# Patient Record
Sex: FEMALE | Race: WHITE | NOT HISPANIC OR LATINO
[De-identification: names, ages, dates, MRNs, and addresses within clinical notes are randomized per-mention and may not be internally consistent; named-entity substitution may affect disease eponyms.]

---

## 2017-01-04 ENCOUNTER — APPOINTMENT (OUTPATIENT)
Dept: ENDOCRINOLOGY | Facility: CLINIC | Age: 82
End: 2017-01-04

## 2017-01-04 VITALS — WEIGHT: 105 LBS | HEIGHT: 59.2 IN | BODY MASS INDEX: 21.17 KG/M2

## 2017-01-04 VITALS — OXYGEN SATURATION: 82 % | DIASTOLIC BLOOD PRESSURE: 70 MMHG | SYSTOLIC BLOOD PRESSURE: 110 MMHG | HEART RATE: 78 BPM

## 2017-01-04 RX ORDER — ASCORBIC ACID 500 MG
TABLET ORAL
Refills: 0 | Status: ACTIVE | COMMUNITY

## 2017-01-04 RX ORDER — DENOSUMAB 60 MG/ML
60 INJECTION SUBCUTANEOUS
Qty: 1 | Refills: 0 | Status: COMPLETED | OUTPATIENT
Start: 2017-01-04

## 2017-01-04 RX ADMIN — DENOSUMAB 0 MG/ML: 60 INJECTION SUBCUTANEOUS at 00:00

## 2017-06-07 ENCOUNTER — EMERGENCY (EMERGENCY)
Facility: HOSPITAL | Age: 82
LOS: 1 days | Discharge: HOME | End: 2017-06-07
Attending: EMERGENCY MEDICINE | Admitting: EMERGENCY MEDICINE
Payer: MEDICARE

## 2017-06-07 VITALS
SYSTOLIC BLOOD PRESSURE: 153 MMHG | RESPIRATION RATE: 16 BRPM | OXYGEN SATURATION: 96 % | DIASTOLIC BLOOD PRESSURE: 75 MMHG | HEART RATE: 66 BPM | TEMPERATURE: 98 F

## 2017-06-07 VITALS
TEMPERATURE: 98 F | HEART RATE: 62 BPM | DIASTOLIC BLOOD PRESSURE: 83 MMHG | OXYGEN SATURATION: 95 % | RESPIRATION RATE: 16 BRPM | SYSTOLIC BLOOD PRESSURE: 175 MMHG

## 2017-06-07 LAB
ALBUMIN SERPL ELPH-MCNC: 4.1 G/DL — SIGNIFICANT CHANGE UP (ref 3.3–5)
ALP SERPL-CCNC: 80 U/L — SIGNIFICANT CHANGE UP (ref 40–120)
ALT FLD-CCNC: 24 U/L RC — SIGNIFICANT CHANGE UP (ref 10–45)
ANION GAP SERPL CALC-SCNC: 11 MMOL/L — SIGNIFICANT CHANGE UP (ref 5–17)
APTT BLD: 25.9 SEC — LOW (ref 27.5–37.4)
AST SERPL-CCNC: 29 U/L — SIGNIFICANT CHANGE UP (ref 10–40)
BASOPHILS # BLD AUTO: 0.1 K/UL — SIGNIFICANT CHANGE UP (ref 0–0.2)
BASOPHILS NFR BLD AUTO: 1.3 % — SIGNIFICANT CHANGE UP (ref 0–2)
BILIRUB SERPL-MCNC: 0.3 MG/DL — SIGNIFICANT CHANGE UP (ref 0.2–1.2)
BUN SERPL-MCNC: 34 MG/DL — HIGH (ref 7–23)
CALCIUM SERPL-MCNC: 9.7 MG/DL — SIGNIFICANT CHANGE UP (ref 8.4–10.5)
CHLORIDE SERPL-SCNC: 99 MMOL/L — SIGNIFICANT CHANGE UP (ref 96–108)
CO2 SERPL-SCNC: 26 MMOL/L — SIGNIFICANT CHANGE UP (ref 22–31)
CREAT SERPL-MCNC: 1.16 MG/DL — SIGNIFICANT CHANGE UP (ref 0.5–1.3)
EOSINOPHIL # BLD AUTO: 0.4 K/UL — SIGNIFICANT CHANGE UP (ref 0–0.5)
EOSINOPHIL NFR BLD AUTO: 5.5 % — SIGNIFICANT CHANGE UP (ref 0–6)
GLUCOSE SERPL-MCNC: 110 MG/DL — HIGH (ref 70–99)
HCT VFR BLD CALC: 40.6 % — SIGNIFICANT CHANGE UP (ref 34.5–45)
HGB BLD-MCNC: 13.3 G/DL — SIGNIFICANT CHANGE UP (ref 11.5–15.5)
INR BLD: 1.03 RATIO — SIGNIFICANT CHANGE UP (ref 0.88–1.16)
LYMPHOCYTES # BLD AUTO: 1.2 K/UL — SIGNIFICANT CHANGE UP (ref 1–3.3)
LYMPHOCYTES # BLD AUTO: 15 % — SIGNIFICANT CHANGE UP (ref 13–44)
MCHC RBC-ENTMCNC: 31.2 PG — SIGNIFICANT CHANGE UP (ref 27–34)
MCHC RBC-ENTMCNC: 32.8 GM/DL — SIGNIFICANT CHANGE UP (ref 32–36)
MCV RBC AUTO: 95.1 FL — SIGNIFICANT CHANGE UP (ref 80–100)
MONOCYTES # BLD AUTO: 0.6 K/UL — SIGNIFICANT CHANGE UP (ref 0–0.9)
MONOCYTES NFR BLD AUTO: 8.2 % — SIGNIFICANT CHANGE UP (ref 2–14)
NEUTROPHILS # BLD AUTO: 5.4 K/UL — SIGNIFICANT CHANGE UP (ref 1.8–7.4)
NEUTROPHILS NFR BLD AUTO: 69.9 % — SIGNIFICANT CHANGE UP (ref 43–77)
PLATELET # BLD AUTO: 392 K/UL — SIGNIFICANT CHANGE UP (ref 150–400)
POTASSIUM SERPL-MCNC: 4.5 MMOL/L — SIGNIFICANT CHANGE UP (ref 3.5–5.3)
POTASSIUM SERPL-SCNC: 4.5 MMOL/L — SIGNIFICANT CHANGE UP (ref 3.5–5.3)
PROT SERPL-MCNC: 7.3 G/DL — SIGNIFICANT CHANGE UP (ref 6–8.3)
PROTHROM AB SERPL-ACNC: 11.1 SEC — SIGNIFICANT CHANGE UP (ref 9.8–12.7)
RBC # BLD: 4.27 M/UL — SIGNIFICANT CHANGE UP (ref 3.8–5.2)
RBC # FLD: 12.8 % — SIGNIFICANT CHANGE UP (ref 10.3–14.5)
SODIUM SERPL-SCNC: 136 MMOL/L — SIGNIFICANT CHANGE UP (ref 135–145)
WBC # BLD: 7.7 K/UL — SIGNIFICANT CHANGE UP (ref 3.8–10.5)
WBC # FLD AUTO: 7.7 K/UL — SIGNIFICANT CHANGE UP (ref 3.8–10.5)

## 2017-06-07 PROCEDURE — 85610 PROTHROMBIN TIME: CPT

## 2017-06-07 PROCEDURE — 85730 THROMBOPLASTIN TIME PARTIAL: CPT

## 2017-06-07 PROCEDURE — 71250 CT THORAX DX C-: CPT

## 2017-06-07 PROCEDURE — 99284 EMERGENCY DEPT VISIT MOD MDM: CPT | Mod: 25

## 2017-06-07 PROCEDURE — 73060 X-RAY EXAM OF HUMERUS: CPT

## 2017-06-07 PROCEDURE — 73030 X-RAY EXAM OF SHOULDER: CPT

## 2017-06-07 PROCEDURE — 71250 CT THORAX DX C-: CPT | Mod: 26

## 2017-06-07 PROCEDURE — 73020 X-RAY EXAM OF SHOULDER: CPT | Mod: 26,59,LT

## 2017-06-07 PROCEDURE — 70450 CT HEAD/BRAIN W/O DYE: CPT

## 2017-06-07 PROCEDURE — 80053 COMPREHEN METABOLIC PANEL: CPT

## 2017-06-07 PROCEDURE — 99284 EMERGENCY DEPT VISIT MOD MDM: CPT

## 2017-06-07 PROCEDURE — 85027 COMPLETE CBC AUTOMATED: CPT

## 2017-06-07 PROCEDURE — 96374 THER/PROPH/DIAG INJ IV PUSH: CPT

## 2017-06-07 PROCEDURE — 73060 X-RAY EXAM OF HUMERUS: CPT | Mod: 26,LT

## 2017-06-07 PROCEDURE — 73020 X-RAY EXAM OF SHOULDER: CPT

## 2017-06-07 PROCEDURE — 73030 X-RAY EXAM OF SHOULDER: CPT | Mod: 26,LT

## 2017-06-07 PROCEDURE — 70450 CT HEAD/BRAIN W/O DYE: CPT | Mod: 26

## 2017-06-07 RX ORDER — ACETAMINOPHEN 500 MG
1000 TABLET ORAL ONCE
Qty: 0 | Refills: 0 | Status: DISCONTINUED | OUTPATIENT
Start: 2017-06-07 | End: 2017-06-07

## 2017-06-07 RX ORDER — MORPHINE SULFATE 50 MG/1
2 CAPSULE, EXTENDED RELEASE ORAL ONCE
Qty: 0 | Refills: 0 | Status: DISCONTINUED | OUTPATIENT
Start: 2017-06-07 | End: 2017-06-07

## 2017-06-07 RX ADMIN — MORPHINE SULFATE 2 MILLIGRAM(S): 50 CAPSULE, EXTENDED RELEASE ORAL at 19:46

## 2017-06-07 NOTE — ED PROVIDER NOTE - CARE PLAN
Principal Discharge DX:	Rotator cuff injury  Instructions for follow-up, activity and diet:	1. Take Tylenol as needed for pain. Ice shoulder in 20 minute intervals several times per day. Wear sling as tolerated with pillow for support.  2. Follow up with orthopaedics in the next few days. See attached list.   3. Return to ED for change of symptoms including increased pain, numbness, tingling weakness, headaches, changes in vision, fevers and any other symptoms of concern.

## 2017-06-07 NOTE — ED ADULT NURSE NOTE - OBJECTIVE STATEMENT
pt is a 89 yr F s/p fall. pt was placing anti-slip caps on the bottom of chairs when the chair slipped and she fell on to her L side, pt recently fx her L ribs 7-8, c/o L shoulder/arm pain. no deformity, + limited ROM with pain. skin intact. denies cp/sob. denies hitting head or LOC.

## 2017-06-07 NOTE — ED ADULT NURSE NOTE - PMH
AI (Aortic Incompetence)    Cataract    GERD (Gastroesophageal Reflux Disease)    History of Osteoporosis    HTN (Hypertension)    Hypertriglyceridemia    Rib Fracture left side  - from fall one months ago    Thoracic Back Pain    Upper Back Pain

## 2017-06-07 NOTE — ED PROVIDER NOTE - PLAN OF CARE
1. Take Tylenol as needed for pain. Ice shoulder in 20 minute intervals several times per day. Wear sling as tolerated with pillow for support.  2. Follow up with orthopaedics in the next few days. See attached list.   3. Return to ED for change of symptoms including increased pain, numbness, tingling weakness, headaches, changes in vision, fevers and any other symptoms of concern.

## 2017-06-07 NOTE — ED ADULT NURSE NOTE - PSH
Cataract, repair  - right  - 7/10    History of Aortic Valve Replacement 2001    History of Colonoscopy    History of Tonsillectomy

## 2017-06-07 NOTE — ED PROVIDER NOTE - OBJECTIVE STATEMENT
89 year old female presents from EMS c/o fall and shoulder pain. Patient was trying to fix a chair at home when she lost her footing falling backwards landing on her left shoulder. She is c/o 9/10 left arm and shoulder pain.  She denies hitting her head, LOC. Patient is on coumadin.  She reports falling 2 weeks ago after standing on a box at home resulting in left sided rib fractures.

## 2017-06-07 NOTE — ED PROVIDER NOTE - MEDICAL DECISION MAKING DETAILS
Fall, R sided shoulder pain, unlikely dislocation; will eval for fx.  NV intact.  Pain control, XRs, reassess.  --BMM

## 2017-06-07 NOTE — ED PROVIDER NOTE - PHYSICAL EXAMINATION
CN intact. Normal sensation and strength of upper and lower extremities b/l. FROM of neck, upper and lower extremities b/l. No focal neuro deficits.   There is tenderness to palpation of the left shoulder with limited above head ROM.  On palpation of the ribs there is left sided tenderness and crepitus.   Patient is neurovascularly intact.

## 2017-07-14 ENCOUNTER — APPOINTMENT (OUTPATIENT)
Dept: ENDOCRINOLOGY | Facility: CLINIC | Age: 82
End: 2017-07-14

## 2017-07-14 VITALS
HEART RATE: 71 BPM | WEIGHT: 101 LBS | HEIGHT: 59.2 IN | BODY MASS INDEX: 20.36 KG/M2 | SYSTOLIC BLOOD PRESSURE: 126 MMHG | DIASTOLIC BLOOD PRESSURE: 70 MMHG | OXYGEN SATURATION: 98 %

## 2017-07-14 RX ORDER — DENOSUMAB 60 MG/ML
60 INJECTION SUBCUTANEOUS
Qty: 1 | Refills: 0 | Status: COMPLETED | OUTPATIENT
Start: 2017-07-14

## 2017-07-14 RX ADMIN — DENOSUMAB 0 MG/ML: 60 INJECTION SUBCUTANEOUS at 00:00

## 2017-07-30 ENCOUNTER — EMERGENCY (EMERGENCY)
Facility: HOSPITAL | Age: 82
LOS: 1 days | Discharge: HOME | End: 2017-07-30
Attending: EMERGENCY MEDICINE | Admitting: EMERGENCY MEDICINE
Payer: MEDICARE

## 2017-07-30 VITALS
DIASTOLIC BLOOD PRESSURE: 69 MMHG | RESPIRATION RATE: 16 BRPM | HEART RATE: 66 BPM | SYSTOLIC BLOOD PRESSURE: 145 MMHG | TEMPERATURE: 98 F | OXYGEN SATURATION: 96 %

## 2017-07-30 VITALS
HEART RATE: 65 BPM | SYSTOLIC BLOOD PRESSURE: 134 MMHG | TEMPERATURE: 98 F | DIASTOLIC BLOOD PRESSURE: 77 MMHG | RESPIRATION RATE: 18 BRPM

## 2017-07-30 LAB
ALBUMIN SERPL ELPH-MCNC: 3.9 G/DL — SIGNIFICANT CHANGE UP (ref 3.3–5)
ALP SERPL-CCNC: 82 U/L — SIGNIFICANT CHANGE UP (ref 40–120)
ALT FLD-CCNC: 22 U/L RC — SIGNIFICANT CHANGE UP (ref 10–45)
ANION GAP SERPL CALC-SCNC: 12 MMOL/L — SIGNIFICANT CHANGE UP (ref 5–17)
APPEARANCE UR: CLEAR — SIGNIFICANT CHANGE UP
AST SERPL-CCNC: 26 U/L — SIGNIFICANT CHANGE UP (ref 10–40)
BASOPHILS # BLD AUTO: 0.1 K/UL — SIGNIFICANT CHANGE UP (ref 0–0.2)
BASOPHILS NFR BLD AUTO: 0.6 % — SIGNIFICANT CHANGE UP (ref 0–2)
BILIRUB SERPL-MCNC: 0.3 MG/DL — SIGNIFICANT CHANGE UP (ref 0.2–1.2)
BILIRUB UR-MCNC: NEGATIVE — SIGNIFICANT CHANGE UP
BUN SERPL-MCNC: 28 MG/DL — HIGH (ref 7–23)
CALCIUM SERPL-MCNC: 9.1 MG/DL — SIGNIFICANT CHANGE UP (ref 8.4–10.5)
CHLORIDE SERPL-SCNC: 100 MMOL/L — SIGNIFICANT CHANGE UP (ref 96–108)
CO2 SERPL-SCNC: 27 MMOL/L — SIGNIFICANT CHANGE UP (ref 22–31)
COLOR SPEC: YELLOW — SIGNIFICANT CHANGE UP
CREAT SERPL-MCNC: 1.03 MG/DL — SIGNIFICANT CHANGE UP (ref 0.5–1.3)
DIFF PNL FLD: NEGATIVE — SIGNIFICANT CHANGE UP
EOSINOPHIL # BLD AUTO: 0.5 K/UL — SIGNIFICANT CHANGE UP (ref 0–0.5)
EOSINOPHIL NFR BLD AUTO: 5.1 % — SIGNIFICANT CHANGE UP (ref 0–6)
EPI CELLS # UR: SIGNIFICANT CHANGE UP /HPF
GLUCOSE SERPL-MCNC: 92 MG/DL — SIGNIFICANT CHANGE UP (ref 70–99)
GLUCOSE UR QL: NEGATIVE — SIGNIFICANT CHANGE UP
HCT VFR BLD CALC: 40.4 % — SIGNIFICANT CHANGE UP (ref 34.5–45)
HGB BLD-MCNC: 13.4 G/DL — SIGNIFICANT CHANGE UP (ref 11.5–15.5)
HYALINE CASTS # UR AUTO: ABNORMAL
KETONES UR-MCNC: NEGATIVE — SIGNIFICANT CHANGE UP
LEUKOCYTE ESTERASE UR-ACNC: ABNORMAL
LYMPHOCYTES # BLD AUTO: 19.6 % — SIGNIFICANT CHANGE UP (ref 13–44)
LYMPHOCYTES # BLD AUTO: 2 K/UL — SIGNIFICANT CHANGE UP (ref 1–3.3)
MCHC RBC-ENTMCNC: 31.2 PG — SIGNIFICANT CHANGE UP (ref 27–34)
MCHC RBC-ENTMCNC: 33.2 GM/DL — SIGNIFICANT CHANGE UP (ref 32–36)
MCV RBC AUTO: 94.2 FL — SIGNIFICANT CHANGE UP (ref 80–100)
MONOCYTES # BLD AUTO: 0.9 K/UL — SIGNIFICANT CHANGE UP (ref 0–0.9)
MONOCYTES NFR BLD AUTO: 9.2 % — SIGNIFICANT CHANGE UP (ref 2–14)
NEUTROPHILS # BLD AUTO: 6.7 K/UL — SIGNIFICANT CHANGE UP (ref 1.8–7.4)
NEUTROPHILS NFR BLD AUTO: 65.5 % — SIGNIFICANT CHANGE UP (ref 43–77)
NITRITE UR-MCNC: NEGATIVE — SIGNIFICANT CHANGE UP
PH UR: 6.5 — SIGNIFICANT CHANGE UP (ref 5–8)
PLATELET # BLD AUTO: 429 K/UL — HIGH (ref 150–400)
POTASSIUM SERPL-MCNC: 4.1 MMOL/L — SIGNIFICANT CHANGE UP (ref 3.5–5.3)
POTASSIUM SERPL-SCNC: 4.1 MMOL/L — SIGNIFICANT CHANGE UP (ref 3.5–5.3)
PROT SERPL-MCNC: 7.4 G/DL — SIGNIFICANT CHANGE UP (ref 6–8.3)
PROT UR-MCNC: NEGATIVE — SIGNIFICANT CHANGE UP
RBC # BLD: 4.29 M/UL — SIGNIFICANT CHANGE UP (ref 3.8–5.2)
RBC # FLD: 12.6 % — SIGNIFICANT CHANGE UP (ref 10.3–14.5)
SODIUM SERPL-SCNC: 139 MMOL/L — SIGNIFICANT CHANGE UP (ref 135–145)
SP GR SPEC: 1.01 — LOW (ref 1.01–1.02)
TROPONIN T SERPL-MCNC: <0.01 NG/ML — SIGNIFICANT CHANGE UP (ref 0–0.06)
UROBILINOGEN FLD QL: NEGATIVE — SIGNIFICANT CHANGE UP
WBC # BLD: 10.2 K/UL — SIGNIFICANT CHANGE UP (ref 3.8–10.5)
WBC # FLD AUTO: 10.2 K/UL — SIGNIFICANT CHANGE UP (ref 3.8–10.5)
WBC UR QL: SIGNIFICANT CHANGE UP /HPF (ref 0–5)

## 2017-07-30 PROCEDURE — 87086 URINE CULTURE/COLONY COUNT: CPT

## 2017-07-30 PROCEDURE — 71020: CPT | Mod: 26

## 2017-07-30 PROCEDURE — 80053 COMPREHEN METABOLIC PANEL: CPT

## 2017-07-30 PROCEDURE — 93010 ELECTROCARDIOGRAM REPORT: CPT

## 2017-07-30 PROCEDURE — 93005 ELECTROCARDIOGRAM TRACING: CPT

## 2017-07-30 PROCEDURE — 99285 EMERGENCY DEPT VISIT HI MDM: CPT | Mod: 25,GC

## 2017-07-30 PROCEDURE — 84484 ASSAY OF TROPONIN QUANT: CPT

## 2017-07-30 PROCEDURE — 81001 URINALYSIS AUTO W/SCOPE: CPT

## 2017-07-30 PROCEDURE — 99283 EMERGENCY DEPT VISIT LOW MDM: CPT | Mod: 25

## 2017-07-30 PROCEDURE — 85027 COMPLETE CBC AUTOMATED: CPT

## 2017-07-30 PROCEDURE — 71046 X-RAY EXAM CHEST 2 VIEWS: CPT

## 2017-07-30 NOTE — ED PROVIDER NOTE - HEAD, MLM
Subjective   Patient ID: Bienvenido Carter is a 37 y.o. female is here today for follow-up of back and neck pain.    At the patient's last visit, it was decided that the patient would continue in physical therapy.    Today, the patient notes     Back Pain   This is a chronic problem. The current episode started more than 1 month ago. The problem occurs daily. The problem has been gradually improving since onset. Pertinent negatives include no fever.   Neck Pain    This is a chronic problem. The current episode started more than 1 month ago. The problem occurs daily. The problem has been gradually improving. Pertinent negatives include no fever.       The following portions of the patient's history were reviewed and updated as appropriate: allergies, current medications, past family history, past medical history, past social history, past surgical history and problem list.    Review of Systems   Constitutional: Negative for fever.   Musculoskeletal: Positive for back pain and neck pain.   Neurological: Negative for syncope.       Objective   Physical Exam  Neurologic Exam    Assessment/Plan   Independent Review of Radiographic Studies:      Medical Decision Making:      There are no diagnoses linked to this encounter.  No Follow-up on file.                 This encounter was created in error - please disregard.   Head is atraumatic. Head shape is symmetrical.

## 2017-07-30 NOTE — ED PROVIDER NOTE - PSYCHIATRIC, MLM
Alert and oriented to person, place, time/situation. normal mood and affect. no apparent risk to self or others. Endorses depressed mood and increased stress.

## 2017-07-30 NOTE — ED ADULT NURSE NOTE - OBJECTIVE STATEMENT
89 yo female brought by EMS from assisted living for dizziness, generalized weakness for several days. Patient states that she has been under increased stress recently because she moved out of her home and into assisted living, and shortly after moving sustained fracture to right foot after walking into the corner of the bed because she was not used to the furniture arrangement. Shortly thereafter patient fell again and sustained multiple left rib and a left shoulder fracture. Over the last few days patient has been experiencing cough, sore throat and saw her PMD, a gastroenterologist, and an allergist who all gave her "a clean bill of health." Patient wasn't improving so she went to Urgent Care center and was prescribed azithromycin for her throat. Patient reports that she also had a CXR at that time which was "clean." Today, patient reports that she feels weak, dizzy, and as if she is going to pass out. Patient lives by herself at the assisted living. Patient states "I think this is a combination of emotional, mental, and physical causes and I am worried that my body cannot take it." Patient states that she has an old BP cuff at home and has been hypertensive over the last few days (patient normotensive in ED).

## 2017-07-30 NOTE — ED PROVIDER NOTE - MEDICAL DECISION MAKING DETAILS
89 yo F w/ PMH of aortic valve replacement presenting with dizziness, fatigue x 2 weeks. Also endorsing increased stress related to move x 3 months. Patient is normotensive with normal HR. 2+ pulses all 4 extremities. Will obtain ecg to r/o cardiac cause of symptoms. Will order CBC and CMP to rule out other causes of dizziness, fatigue. If all exams normal, may follow up with PMD as outpatient. 89 yo F w/ PMH of aortic valve replacement presenting with dizziness, fatigue x 2 weeks. Also endorsing increased stress related to move x 3 months. Patient is normotensive with normal HR. 2+ pulses all 4 extremities. Will obtain ecg to r/o cardiac cause of symptoms. Will order CBC and CMP to rule out other causes of dizziness, fatigue. If all exams normal, may follow up with PMD as outpatient.    vishnu Culver 88 yo F w/ PMH of aortic valve replacement presenting with dizziness, fatigue x 2 weeks. Also endorsing increased stress related to move x 3 months. Patient is normotensive with normal HR. 2+ pulses all 4 extremities. Will obtain ecg to r/o cardiac cause of symptoms. Will also order troponin and CXR bc dizziness, fatigue is exertional. Will order CBC, CMP, UACNS to rule out other causes of dizziness, fatigue. If all exams normal, may follow up with PMD as outpatient.    vishnu Culver 89 yo F w/ PMH of aortic valve replacement presenting with dizziness, fatigue x 2 weeks. Also endorsing increased stress related to move x 3 months. Patient is normotensive with normal HR. 2+ pulses all 4 extremities. Will obtain ecg to r/o cardiac cause of symptoms. Will also order troponin and CXR bc dizziness, fatigue is exertional. Will order CBC, CMP, UACNS to rule out other causes of dizziness, fatigue. If all exams normal, may follow up with PMD as outpatient.  vishnu - exertional fatigue recent uri - no fever - ck labs r/o anemia - ekg trop r/o acs- need delta trop - ck ua r/o infectious etiology - if neg w/u consider dc and fu outpt     vishnu -

## 2017-07-30 NOTE — ED PROVIDER NOTE - OBJECTIVE STATEMENT
88 yo F c/o dizziness and unsteadiness. Patient is afraid that she is going to fall. Patient also sleeping more than usual. Claims it is "hard work to keep myself going." Claims these symptoms have been present for about 2 weeks. Patient indicates that her BP machine at home has been indicating a high blood pressure. Patient lives at the [facility], and the nurse was not present today, so she decided to come to the ER.   Broke left ribs and shoulder about 1 -2 months ago. Had complete physical at [facility] about 10 days ago. Told it was a normal exam other than some redness in the back of the throat, which was attributed to GERD. Patient went to allergist, who told her that her throat was clear. Patient went to specialist for GERD. Patient went to urgent care and was told she had pharyngitis, and was given an antibiotic and now feels better. Patient endorses increased stress recently related to her move. Patient indicates that she has had less energy and that her mood is worse since the move.    Denies pain, fever, HA, CP, SOB, ab pain, n/v, diarrhea, sore throat.  Endorses fatigue, dizziness, cough. 89 yo F c/o dizziness and unsteadiness. Patient is afraid that she is going to fall. Patient also sleeping more than usual. Claims it is "hard work to keep myself going." Claims these symptoms have been present for about 2 weeks. Patient indicates that her BP machine at home has been indicating a high blood pressure. Patient lives at the [facility], and the nurse was not present today, so she decided to come to the ER.   Broke left ribs and shoulder about 1 -2 months ago. Had complete physical at [facility] about 10 days ago. Told it was a normal exam other than some redness in the back of the throat, which was attributed to GERD. Patient went to allergist, who told her that her throat was clear. Patient went to specialist for GERD. Patient went to urgent care and was told she had pharyngitis, and was given an antibiotic and now feels better. Patient endorses increased stress recently related to her move. Patient indicates that she has had less energy and that her mood is worse since the move. Patient believes her symptoms may be due to stress.    Denies pain, fever, HA, CP, SOB, ab pain, n/v, diarrhea, sore throat.  Endorses fatigue, dizziness, cough.

## 2017-07-30 NOTE — ED PROVIDER NOTE - PROGRESS NOTE DETAILS
Delta troponin both negative. UA negative. CXR negative. Patient may be discharged. Delta troponin both negative. UA negative. CXR negative. Patient may be discharged.  Dr. Sparrow Note: s/o pending repeat trop, negative, stable for dc home.

## 2017-07-31 LAB
CULTURE RESULTS: NO GROWTH — SIGNIFICANT CHANGE UP
SPECIMEN SOURCE: SIGNIFICANT CHANGE UP

## 2018-01-13 ENCOUNTER — TRANSCRIPTION ENCOUNTER (OUTPATIENT)
Age: 83
End: 2018-01-13

## 2018-01-25 ENCOUNTER — APPOINTMENT (OUTPATIENT)
Dept: ENDOCRINOLOGY | Facility: CLINIC | Age: 83
End: 2018-01-25
Payer: MEDICARE

## 2018-01-25 ENCOUNTER — LABORATORY RESULT (OUTPATIENT)
Age: 83
End: 2018-01-25

## 2018-01-25 VITALS — BODY MASS INDEX: 22.07 KG/M2 | HEIGHT: 58.8 IN | WEIGHT: 108 LBS

## 2018-01-25 VITALS
HEIGHT: 58 IN | SYSTOLIC BLOOD PRESSURE: 140 MMHG | WEIGHT: 109 LBS | BODY MASS INDEX: 22.88 KG/M2 | DIASTOLIC BLOOD PRESSURE: 64 MMHG | OXYGEN SATURATION: 98 % | HEART RATE: 56 BPM

## 2018-01-25 PROCEDURE — 77080 DXA BONE DENSITY AXIAL: CPT | Mod: GA

## 2018-01-25 PROCEDURE — ZZZZZ: CPT

## 2018-01-25 PROCEDURE — 99214 OFFICE O/P EST MOD 30 MIN: CPT | Mod: 25

## 2018-01-25 PROCEDURE — 96372 THER/PROPH/DIAG INJ SC/IM: CPT

## 2018-01-25 RX ORDER — AZITHROMYCIN 250 MG/1
250 TABLET, FILM COATED ORAL
Qty: 6 | Refills: 0 | Status: COMPLETED | COMMUNITY
Start: 2017-07-23

## 2018-01-25 RX ORDER — DENOSUMAB 60 MG/ML
60 INJECTION SUBCUTANEOUS
Qty: 0 | Refills: 0 | Status: COMPLETED | OUTPATIENT
Start: 2018-01-25

## 2018-01-25 RX ORDER — PROMETHAZINEHYDROCHLORIDE AND PHENYLEPHRINE HYDROCHLORIDE 6.25; 5 MG/5ML; MG/5ML
6.25-5 SYRUP ORAL
Qty: 140 | Refills: 0 | Status: COMPLETED | COMMUNITY
Start: 2017-07-23

## 2018-01-25 RX ADMIN — DENOSUMAB 0 MG/ML: 60 INJECTION SUBCUTANEOUS at 00:00

## 2018-01-26 LAB
25(OH)D3 SERPL-MCNC: 72.3 NG/ML
ALBUMIN SERPL ELPH-MCNC: 4.3 G/DL
ALP BLD-CCNC: 86 U/L
ALT SERPL-CCNC: 21 U/L
ANION GAP SERPL CALC-SCNC: 16 MMOL/L
AST SERPL-CCNC: 24 U/L
BASOPHILS # BLD AUTO: 0.05 K/UL
BASOPHILS NFR BLD AUTO: 0.5 %
BILIRUB SERPL-MCNC: 0.4 MG/DL
BUN SERPL-MCNC: 34 MG/DL
CALCIUM SERPL-MCNC: 9.8 MG/DL
CHLORIDE SERPL-SCNC: 103 MMOL/L
CO2 SERPL-SCNC: 25 MMOL/L
CREAT SERPL-MCNC: 0.96 MG/DL
EOSINOPHIL # BLD AUTO: 0.65 K/UL
EOSINOPHIL NFR BLD AUTO: 6.8 %
GLUCOSE SERPL-MCNC: 151 MG/DL
HBA1C MFR BLD HPLC: 6.4 %
HCT VFR BLD CALC: 41.7 %
HGB BLD-MCNC: 13 G/DL
IMM GRANULOCYTES NFR BLD AUTO: 0.3 %
LYMPHOCYTES # BLD AUTO: 1.36 K/UL
LYMPHOCYTES NFR BLD AUTO: 14.2 %
MAGNESIUM SERPL-MCNC: 2.1 MG/DL
MAN DIFF?: NORMAL
MCHC RBC-ENTMCNC: 29.7 PG
MCHC RBC-ENTMCNC: 31.2 GM/DL
MCV RBC AUTO: 95.2 FL
MONOCYTES # BLD AUTO: 0.69 K/UL
MONOCYTES NFR BLD AUTO: 7.2 %
NEUTROPHILS # BLD AUTO: 6.82 K/UL
NEUTROPHILS NFR BLD AUTO: 71 %
PLATELET # BLD AUTO: 410 K/UL
POTASSIUM SERPL-SCNC: 4.9 MMOL/L
PROT SERPL-MCNC: 7.2 G/DL
RBC # BLD: 4.38 M/UL
RBC # FLD: 14.6 %
SODIUM SERPL-SCNC: 144 MMOL/L
TSH SERPL-ACNC: 6.3 UIU/ML
WBC # FLD AUTO: 9.6 K/UL

## 2018-02-06 ENCOUNTER — TRANSCRIPTION ENCOUNTER (OUTPATIENT)
Age: 83
End: 2018-02-06

## 2018-02-21 ENCOUNTER — APPOINTMENT (OUTPATIENT)
Dept: GERIATRICS | Facility: CLINIC | Age: 83
End: 2018-02-21
Payer: MEDICARE

## 2018-02-21 VITALS
HEART RATE: 68 BPM | TEMPERATURE: 97.4 F | HEIGHT: 58 IN | SYSTOLIC BLOOD PRESSURE: 90 MMHG | BODY MASS INDEX: 22.88 KG/M2 | DIASTOLIC BLOOD PRESSURE: 60 MMHG | WEIGHT: 109 LBS | RESPIRATION RATE: 15 BRPM | OXYGEN SATURATION: 97 %

## 2018-02-21 DIAGNOSIS — K76.9 LIVER DISEASE, UNSPECIFIED: ICD-10-CM

## 2018-02-21 PROCEDURE — 99205 OFFICE O/P NEW HI 60 MIN: CPT | Mod: GC

## 2018-03-20 ENCOUNTER — MEDICATION RENEWAL (OUTPATIENT)
Age: 83
End: 2018-03-20

## 2018-03-28 ENCOUNTER — APPOINTMENT (OUTPATIENT)
Dept: GERIATRICS | Facility: CLINIC | Age: 83
End: 2018-03-28
Payer: MEDICARE

## 2018-03-28 VITALS
TEMPERATURE: 98.4 F | SYSTOLIC BLOOD PRESSURE: 126 MMHG | HEART RATE: 70 BPM | OXYGEN SATURATION: 97 % | DIASTOLIC BLOOD PRESSURE: 64 MMHG | RESPIRATION RATE: 18 BRPM | BODY MASS INDEX: 22.99 KG/M2 | WEIGHT: 110 LBS

## 2018-03-28 DIAGNOSIS — E04.1 NONTOXIC SINGLE THYROID NODULE: ICD-10-CM

## 2018-03-28 DIAGNOSIS — R09.82 POSTNASAL DRIP: ICD-10-CM

## 2018-03-28 DIAGNOSIS — M65.332 TRIGGER FINGER, LEFT MIDDLE FINGER: ICD-10-CM

## 2018-03-28 DIAGNOSIS — E55.9 VITAMIN D DEFICIENCY, UNSPECIFIED: ICD-10-CM

## 2018-03-28 PROCEDURE — 99215 OFFICE O/P EST HI 40 MIN: CPT | Mod: GC

## 2018-03-28 RX ORDER — FLUTICASONE PROPIONATE 50 UG/1
50 SPRAY, METERED NASAL DAILY
Qty: 1 | Refills: 0 | Status: ACTIVE | COMMUNITY
Start: 2018-03-28 | End: 1900-01-01

## 2018-04-02 LAB
25(OH)D3 SERPL-MCNC: 55.8 NG/ML
TSH SERPL-ACNC: 3.51 UIU/ML

## 2018-04-17 ENCOUNTER — OUTPATIENT (OUTPATIENT)
Dept: OUTPATIENT SERVICES | Facility: HOSPITAL | Age: 83
LOS: 1 days | Discharge: HOME | End: 2018-04-17
Payer: MEDICARE

## 2018-04-17 PROCEDURE — 90792 PSYCH DIAG EVAL W/MED SRVCS: CPT

## 2018-04-18 DIAGNOSIS — F33.9 MAJOR DEPRESSIVE DISORDER, RECURRENT, UNSPECIFIED: ICD-10-CM

## 2018-05-09 PROCEDURE — 99213 OFFICE O/P EST LOW 20 MIN: CPT

## 2018-05-30 ENCOUNTER — RX RENEWAL (OUTPATIENT)
Age: 83
End: 2018-05-30

## 2018-05-30 DIAGNOSIS — R93.2 ABNORMAL FINDINGS ON DIAGNOSTIC IMAGING OF LIVER AND BILIARY TRACT: ICD-10-CM

## 2018-05-30 DIAGNOSIS — M18.9 OSTEOARTHRITIS OF FIRST CARPOMETACARPAL JOINT, UNSPECIFIED: ICD-10-CM

## 2018-06-28 PROCEDURE — 99214 OFFICE O/P EST MOD 30 MIN: CPT

## 2018-07-05 RX ORDER — LOSARTAN POTASSIUM 50 MG/1
50 TABLET, FILM COATED ORAL DAILY
Qty: 30 | Refills: 3 | Status: DISCONTINUED | COMMUNITY
Start: 2018-02-21 | End: 2018-07-05

## 2018-07-05 RX ORDER — OMEPRAZOLE 40 MG/1
40 CAPSULE, DELAYED RELEASE ORAL DAILY
Qty: 30 | Refills: 3 | Status: DISCONTINUED | COMMUNITY
Start: 2017-05-16 | End: 2018-07-05

## 2018-07-16 ENCOUNTER — APPOINTMENT (OUTPATIENT)
Dept: GERIATRICS | Facility: CLINIC | Age: 83
End: 2018-07-16
Payer: MEDICARE

## 2018-07-16 VITALS
SYSTOLIC BLOOD PRESSURE: 120 MMHG | TEMPERATURE: 98.7 F | WEIGHT: 118 LBS | BODY MASS INDEX: 24.77 KG/M2 | DIASTOLIC BLOOD PRESSURE: 70 MMHG | OXYGEN SATURATION: 92 % | HEART RATE: 77 BPM | RESPIRATION RATE: 15 BRPM | HEIGHT: 58 IN

## 2018-07-16 DIAGNOSIS — L29.9 PRURITUS, UNSPECIFIED: ICD-10-CM

## 2018-07-16 PROCEDURE — 99215 OFFICE O/P EST HI 40 MIN: CPT | Mod: GC

## 2018-08-08 ENCOUNTER — APPOINTMENT (OUTPATIENT)
Dept: ENDOCRINOLOGY | Facility: CLINIC | Age: 83
End: 2018-08-08
Payer: MEDICARE

## 2018-08-08 VITALS
DIASTOLIC BLOOD PRESSURE: 66 MMHG | HEIGHT: 58 IN | WEIGHT: 108 LBS | HEART RATE: 54 BPM | OXYGEN SATURATION: 98 % | BODY MASS INDEX: 22.67 KG/M2 | SYSTOLIC BLOOD PRESSURE: 140 MMHG

## 2018-08-08 DIAGNOSIS — M81.0 AGE-RELATED OSTEOPOROSIS W/OUT CURRENT PATHOLOGICAL FRACTURE: ICD-10-CM

## 2018-08-08 DIAGNOSIS — E03.9 HYPOTHYROIDISM, UNSPECIFIED: ICD-10-CM

## 2018-08-08 PROCEDURE — 99214 OFFICE O/P EST MOD 30 MIN: CPT | Mod: 25

## 2018-08-08 PROCEDURE — 96372 THER/PROPH/DIAG INJ SC/IM: CPT

## 2018-08-08 RX ORDER — LEVOTHYROXINE SODIUM 0.03 MG/1
25 TABLET ORAL DAILY
Qty: 30 | Refills: 3 | Status: DISCONTINUED | COMMUNITY
Start: 2018-02-21 | End: 2018-08-08

## 2018-08-08 RX ORDER — DENOSUMAB 60 MG/ML
60 INJECTION SUBCUTANEOUS
Qty: 0 | Refills: 0 | Status: COMPLETED | OUTPATIENT
Start: 2018-08-08

## 2018-08-08 RX ADMIN — DENOSUMAB 0 MG/ML: 60 INJECTION SUBCUTANEOUS at 00:00

## 2018-09-21 ENCOUNTER — APPOINTMENT (OUTPATIENT)
Dept: VASCULAR SURGERY | Facility: CLINIC | Age: 83
End: 2018-09-21
Payer: MEDICARE

## 2018-09-21 VITALS
WEIGHT: 108 LBS | BODY MASS INDEX: 22.67 KG/M2 | DIASTOLIC BLOOD PRESSURE: 70 MMHG | HEART RATE: 74 BPM | HEIGHT: 58 IN | SYSTOLIC BLOOD PRESSURE: 148 MMHG

## 2018-09-21 DIAGNOSIS — I80.3 PHLEBITIS AND THROMBOPHLEBITIS OF LOWER EXTREMITIES, UNSPECIFIED: ICD-10-CM

## 2018-09-21 PROCEDURE — 99203 OFFICE O/P NEW LOW 30 MIN: CPT

## 2018-10-02 ENCOUNTER — APPOINTMENT (OUTPATIENT)
Dept: GASTROENTEROLOGY | Facility: CLINIC | Age: 83
End: 2018-10-02

## 2018-10-05 ENCOUNTER — APPOINTMENT (OUTPATIENT)
Dept: VASCULAR SURGERY | Facility: CLINIC | Age: 83
End: 2018-10-05

## 2018-10-15 ENCOUNTER — RX RENEWAL (OUTPATIENT)
Age: 83
End: 2018-10-15

## 2019-02-12 ENCOUNTER — APPOINTMENT (OUTPATIENT)
Dept: ENDOCRINOLOGY | Facility: CLINIC | Age: 84
End: 2019-02-12

## 2019-06-21 ENCOUNTER — RX RENEWAL (OUTPATIENT)
Age: 84
End: 2019-06-21

## 2019-09-04 ENCOUNTER — RX RENEWAL (OUTPATIENT)
Age: 84
End: 2019-09-04

## 2019-09-04 RX ORDER — TRAMADOL HYDROCHLORIDE 50 MG/1
50 TABLET, COATED ORAL 3 TIMES DAILY
Qty: 90 | Refills: 0 | Status: ACTIVE | COMMUNITY
Start: 2019-09-04 | End: 1900-01-01

## 2019-10-08 ENCOUNTER — RECORD ABSTRACTING (OUTPATIENT)
Age: 84
End: 2019-10-08

## 2019-10-08 DIAGNOSIS — Z86.39 PERSONAL HISTORY OF OTHER ENDOCRINE, NUTRITIONAL AND METABOLIC DISEASE: ICD-10-CM

## 2019-10-08 DIAGNOSIS — Z82.49 FAMILY HISTORY OF ISCHEMIC HEART DISEASE AND OTHER DISEASES OF THE CIRCULATORY SYSTEM: ICD-10-CM

## 2019-10-08 DIAGNOSIS — Z86.73 PERSONAL HISTORY OF TRANSIENT ISCHEMIC ATTACK (TIA), AND CEREBRAL INFARCTION W/OUT RESIDUAL DEFICITS: ICD-10-CM

## 2019-10-08 DIAGNOSIS — Z87.442 PERSONAL HISTORY OF URINARY CALCULI: ICD-10-CM

## 2019-10-08 DIAGNOSIS — Z80.1 FAMILY HISTORY OF MALIGNANT NEOPLASM OF TRACHEA, BRONCHUS AND LUNG: ICD-10-CM

## 2019-10-08 DIAGNOSIS — Z83.3 FAMILY HISTORY OF DIABETES MELLITUS: ICD-10-CM

## 2019-10-08 RX ORDER — AMLODIPINE BESYLATE 5 MG/1
5 TABLET ORAL
Refills: 0 | Status: ACTIVE | COMMUNITY

## 2019-10-08 RX ORDER — BUPROPION HYDROCHLORIDE 150 MG/1
150 TABLET, FILM COATED ORAL
Refills: 0 | Status: ACTIVE | COMMUNITY

## 2019-10-08 RX ORDER — CLOPIDOGREL BISULFATE 75 MG/1
75 TABLET, FILM COATED ORAL DAILY
Qty: 90 | Refills: 3 | Status: COMPLETED | COMMUNITY
Start: 2019-06-21 | End: 2019-10-08

## 2019-10-23 ENCOUNTER — APPOINTMENT (OUTPATIENT)
Dept: INTERNAL MEDICINE | Facility: CLINIC | Age: 84
End: 2019-10-23
Payer: MEDICARE

## 2019-10-23 VITALS
SYSTOLIC BLOOD PRESSURE: 135 MMHG | TEMPERATURE: 98 F | DIASTOLIC BLOOD PRESSURE: 60 MMHG | OXYGEN SATURATION: 98 % | HEART RATE: 76 BPM

## 2019-10-23 DIAGNOSIS — F03.90 UNSPECIFIED DEMENTIA W/OUT BEHAVIORAL DISTURBANCE: ICD-10-CM

## 2019-10-23 DIAGNOSIS — Z95.3 PRESENCE OF XENOGENIC HEART VALVE: ICD-10-CM

## 2019-10-23 DIAGNOSIS — F32.9 MAJOR DEPRESSIVE DISORDER, SINGLE EPISODE, UNSPECIFIED: ICD-10-CM

## 2019-10-23 DIAGNOSIS — I10 ESSENTIAL (PRIMARY) HYPERTENSION: ICD-10-CM

## 2019-10-23 DIAGNOSIS — E11.9 TYPE 2 DIABETES MELLITUS W/OUT COMPLICATIONS: ICD-10-CM

## 2019-10-23 DIAGNOSIS — R73.09 OTHER ABNORMAL GLUCOSE: ICD-10-CM

## 2019-10-23 PROCEDURE — 36415 COLL VENOUS BLD VENIPUNCTURE: CPT

## 2019-10-23 PROCEDURE — 99214 OFFICE O/P EST MOD 30 MIN: CPT | Mod: 25

## 2019-10-23 RX ORDER — BUPROPION HYDROCHLORIDE 75 MG/1
75 TABLET, FILM COATED ORAL
Qty: 90 | Refills: 3 | Status: ACTIVE | COMMUNITY
Start: 2018-07-16 | End: 1900-01-01

## 2019-10-23 RX ORDER — CLOPIDOGREL 75 MG/1
75 TABLET, FILM COATED ORAL DAILY
Qty: 90 | Refills: 3 | Status: ACTIVE | COMMUNITY
Start: 2019-10-23

## 2019-10-23 RX ORDER — FAMOTIDINE 20 MG/1
20 TABLET, FILM COATED ORAL TWICE DAILY
Qty: 180 | Refills: 3 | Status: ACTIVE | COMMUNITY
Start: 1900-01-01 | End: 1900-01-01

## 2019-10-23 RX ORDER — ATORVASTATIN CALCIUM 40 MG/1
40 TABLET, FILM COATED ORAL
Qty: 90 | Refills: 3 | Status: ACTIVE | COMMUNITY
Start: 1900-01-01 | End: 1900-01-01

## 2019-10-23 RX ORDER — RIVAROXABAN 15 MG/1
15 TABLET, FILM COATED ORAL
Refills: 0 | Status: DISCONTINUED | COMMUNITY
End: 2019-10-23

## 2019-10-23 RX ORDER — BUPROPION HYDROCHLORIDE 150 MG/1
150 TABLET, FILM COATED, EXTENDED RELEASE ORAL DAILY
Qty: 90 | Refills: 0 | Status: ACTIVE | COMMUNITY
Start: 2019-10-23 | End: 1900-01-01

## 2019-10-23 NOTE — PHYSICAL EXAM
[No Acute Distress] : no acute distress [Well Nourished] : well nourished [Well Developed] : well developed [Well-Appearing] : well-appearing [Normal Sclera/Conjunctiva] : normal sclera/conjunctiva [PERRL] : pupils equal round and reactive to light [EOMI] : extraocular movements intact [Normal Outer Ear/Nose] : the outer ears and nose were normal in appearance [Normal Oropharynx] : the oropharynx was normal [No JVD] : no jugular venous distention [No Lymphadenopathy] : no lymphadenopathy [Supple] : supple [Thyroid Normal, No Nodules] : the thyroid was normal and there were no nodules present [No Respiratory Distress] : no respiratory distress  [No Accessory Muscle Use] : no accessory muscle use [Clear to Auscultation] : lungs were clear to auscultation bilaterally [Normal Rate] : normal rate  [Regular Rhythm] : with a regular rhythm [Normal S1, S2] : normal S1 and S2 [No Murmur] : no murmur heard [No Carotid Bruits] : no carotid bruits [No Abdominal Bruit] : a ~M bruit was not heard ~T in the abdomen [No Varicosities] : no varicosities [Pedal Pulses Present] : the pedal pulses are present [No Edema] : there was no peripheral edema [No Palpable Aorta] : no palpable aorta [No Extremity Clubbing/Cyanosis] : no extremity clubbing/cyanosis [Soft] : abdomen soft [Non Tender] : non-tender [Non-distended] : non-distended [No Masses] : no abdominal mass palpated [No HSM] : no HSM [Normal Bowel Sounds] : normal bowel sounds [Normal Posterior Cervical Nodes] : no posterior cervical lymphadenopathy [Normal Anterior Cervical Nodes] : no anterior cervical lymphadenopathy [No CVA Tenderness] : no CVA  tenderness [Normal] : no posterior cervical lymphadenopathy and no anterior cervical lymphadenopathy [No Spinal Tenderness] : no spinal tenderness [No Joint Swelling] : no joint swelling [Grossly Normal Strength/Tone] : grossly normal strength/tone [No Focal Deficits] : no focal deficits [Coordination Grossly Intact] : coordination grossly intact [Normal Affect] : the affect was normal [Normal Gait] : normal gait [Deep Tendon Reflexes (DTR)] : deep tendon reflexes were 2+ and symmetric [Normal Insight/Judgement] : insight and judgment were intact [de-identified] : there is a 2/6 ejection murmur consistent with aortic valve replacement mild aortic stenosis [de-identified] : female in no acute distress [de-identified] : poor peripheral pulses in the feet but they are warm [de-identified] : could not remember the president's name and date [de-identified] : 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

## 2019-10-23 NOTE — HISTORY OF PRESENT ILLNESS
[de-identified] : the patient is a 89-year-old female who comes in for followup of hypertension, hyperlipidemia status post aortic valve replacement with porcine valve and cognitive dysfunction she is living in a [facility] facility[facility] the present time is having some depression and not doing any of the activitiesshe is on Wellbutrin 150mg -12 hour doseand will increase to twice a day. she denies chest pain palpitations swelling fatigue and dyspnea and is getting around without a cane and has not fallen. She is taking half of an amlodipine 5 mg instead of her regular dose. She has an itchy rash on her back which has not responded to cortisone once a day topically With these exceptions the daughter notes no other significant problems

## 2019-10-23 NOTE — ASSESSMENT
[FreeTextEntry1] : patient presents to assess her hyperglycemia, hypertension, hyperlipidemia and question of abnormal liver. She is doing well we will increase her Wellbutrin 150 b.i.d. and see if she improves. meds at the

## 2019-10-24 LAB
ALBUMIN SERPL ELPH-MCNC: 4 G/DL
ALP BLD-CCNC: 134 U/L
ALT SERPL-CCNC: 19 U/L
ANION GAP SERPL CALC-SCNC: 15 MMOL/L
AST SERPL-CCNC: 27 U/L
BASOPHILS # BLD AUTO: 0.07 K/UL
BASOPHILS NFR BLD AUTO: 0.9 %
BILIRUB SERPL-MCNC: 0.2 MG/DL
BUN SERPL-MCNC: 18 MG/DL
CALCIUM SERPL-MCNC: 9.5 MG/DL
CHLORIDE SERPL-SCNC: 104 MMOL/L
CHOLEST SERPL-MCNC: 167 MG/DL
CHOLEST/HDLC SERPL: 3.3 RATIO
CK SERPL-CCNC: 58 U/L
CO2 SERPL-SCNC: 24 MMOL/L
CREAT SERPL-MCNC: 0.82 MG/DL
EOSINOPHIL # BLD AUTO: 0.74 K/UL
EOSINOPHIL NFR BLD AUTO: 9 %
GLUCOSE SERPL-MCNC: 148 MG/DL
HCT VFR BLD CALC: 33.1 %
HDLC SERPL-MCNC: 50 MG/DL
HGB BLD-MCNC: 9.7 G/DL
IMM GRANULOCYTES NFR BLD AUTO: 0.6 %
LDLC SERPL CALC-MCNC: 75 MG/DL
LYMPHOCYTES # BLD AUTO: 0.96 K/UL
LYMPHOCYTES NFR BLD AUTO: 11.7 %
MAN DIFF?: NORMAL
MCHC RBC-ENTMCNC: 26.9 PG
MCHC RBC-ENTMCNC: 29.3 GM/DL
MCV RBC AUTO: 91.9 FL
MONOCYTES # BLD AUTO: 0.74 K/UL
MONOCYTES NFR BLD AUTO: 9 %
NEUTROPHILS # BLD AUTO: 5.66 K/UL
NEUTROPHILS NFR BLD AUTO: 68.8 %
PLATELET # BLD AUTO: 541 K/UL
POTASSIUM SERPL-SCNC: 4.2 MMOL/L
PROT SERPL-MCNC: 6.9 G/DL
RBC # BLD: 3.6 M/UL
RBC # FLD: 14.4 %
SODIUM SERPL-SCNC: 143 MMOL/L
TRIGL SERPL-MCNC: 209 MG/DL
TSH SERPL-ACNC: 7.92 UIU/ML
WBC # FLD AUTO: 8.22 K/UL

## 2019-11-03 ENCOUNTER — INPATIENT (INPATIENT)
Facility: HOSPITAL | Age: 84
LOS: 0 days | DRG: 951 | End: 2019-11-04
Attending: SURGERY | Admitting: SURGERY
Payer: MEDICARE

## 2019-11-03 VITALS
SYSTOLIC BLOOD PRESSURE: 172 MMHG | TEMPERATURE: 97 F | HEIGHT: 65 IN | RESPIRATION RATE: 16 BRPM | OXYGEN SATURATION: 99 % | DIASTOLIC BLOOD PRESSURE: 78 MMHG | HEART RATE: 68 BPM | WEIGHT: 89.95 LBS

## 2019-11-03 DIAGNOSIS — Z51.5 ENCOUNTER FOR PALLIATIVE CARE: ICD-10-CM

## 2019-11-03 DIAGNOSIS — D64.9 ANEMIA, UNSPECIFIED: ICD-10-CM

## 2019-11-03 DIAGNOSIS — K92.2 GASTROINTESTINAL HEMORRHAGE, UNSPECIFIED: ICD-10-CM

## 2019-11-03 DIAGNOSIS — Z71.89 OTHER SPECIFIED COUNSELING: ICD-10-CM

## 2019-11-03 DIAGNOSIS — Z78.9 OTHER SPECIFIED HEALTH STATUS: ICD-10-CM

## 2019-11-03 DIAGNOSIS — R52 PAIN, UNSPECIFIED: ICD-10-CM

## 2019-11-03 DIAGNOSIS — R53.81 OTHER MALAISE: ICD-10-CM

## 2019-11-03 LAB
ALBUMIN SERPL ELPH-MCNC: 3.6 G/DL — SIGNIFICANT CHANGE UP (ref 3.3–5)
ALP SERPL-CCNC: 97 U/L — SIGNIFICANT CHANGE UP (ref 40–120)
ALT FLD-CCNC: 20 U/L — SIGNIFICANT CHANGE UP (ref 10–45)
ANION GAP SERPL CALC-SCNC: 14 MMOL/L — SIGNIFICANT CHANGE UP (ref 5–17)
APTT BLD: 22.5 SEC — LOW (ref 27.5–36.3)
AST SERPL-CCNC: 22 U/L — SIGNIFICANT CHANGE UP (ref 10–40)
BASE EXCESS BLDV CALC-SCNC: -0.3 MMOL/L — SIGNIFICANT CHANGE UP (ref -2–2)
BASOPHILS # BLD AUTO: 0.04 K/UL — SIGNIFICANT CHANGE UP (ref 0–0.2)
BASOPHILS NFR BLD AUTO: 0.3 % — SIGNIFICANT CHANGE UP (ref 0–2)
BILIRUB SERPL-MCNC: 0.5 MG/DL — SIGNIFICANT CHANGE UP (ref 0.2–1.2)
BUN SERPL-MCNC: 38 MG/DL — HIGH (ref 7–23)
CA-I SERPL-SCNC: 1.12 MMOL/L — SIGNIFICANT CHANGE UP (ref 1.12–1.3)
CALCIUM SERPL-MCNC: 8.5 MG/DL — SIGNIFICANT CHANGE UP (ref 8.4–10.5)
CHLORIDE BLDV-SCNC: 109 MMOL/L — HIGH (ref 96–108)
CHLORIDE SERPL-SCNC: 105 MMOL/L — SIGNIFICANT CHANGE UP (ref 96–108)
CO2 BLDV-SCNC: 26 MMOL/L — SIGNIFICANT CHANGE UP (ref 22–30)
CO2 SERPL-SCNC: 22 MMOL/L — SIGNIFICANT CHANGE UP (ref 22–31)
CREAT SERPL-MCNC: 0.87 MG/DL — SIGNIFICANT CHANGE UP (ref 0.5–1.3)
EOSINOPHIL # BLD AUTO: 0.05 K/UL — SIGNIFICANT CHANGE UP (ref 0–0.5)
EOSINOPHIL NFR BLD AUTO: 0.3 % — SIGNIFICANT CHANGE UP (ref 0–6)
GAS PNL BLDV: 137 MMOL/L — SIGNIFICANT CHANGE UP (ref 135–145)
GAS PNL BLDV: SIGNIFICANT CHANGE UP
GLUCOSE BLDV-MCNC: 200 MG/DL — HIGH (ref 70–99)
GLUCOSE SERPL-MCNC: 211 MG/DL — HIGH (ref 70–99)
HCO3 BLDV-SCNC: 24 MMOL/L — SIGNIFICANT CHANGE UP (ref 21–29)
HCT VFR BLD CALC: 26.3 % — LOW (ref 34.5–45)
HCT VFR BLDA CALC: 25 % — LOW (ref 39–50)
HGB BLD CALC-MCNC: 8 G/DL — LOW (ref 11.5–15.5)
HGB BLD-MCNC: 8 G/DL — LOW (ref 11.5–15.5)
IMM GRANULOCYTES NFR BLD AUTO: 1.5 % — SIGNIFICANT CHANGE UP (ref 0–1.5)
INR BLD: 1.13 RATIO — SIGNIFICANT CHANGE UP (ref 0.88–1.16)
LACTATE BLDV-MCNC: 1.8 MMOL/L — SIGNIFICANT CHANGE UP (ref 0.7–2)
LIDOCAIN IGE QN: 12 U/L — SIGNIFICANT CHANGE UP (ref 7–60)
LYMPHOCYTES # BLD AUTO: 0.69 K/UL — LOW (ref 1–3.3)
LYMPHOCYTES # BLD AUTO: 4.6 % — LOW (ref 13–44)
MCHC RBC-ENTMCNC: 26.3 PG — LOW (ref 27–34)
MCHC RBC-ENTMCNC: 30.4 GM/DL — LOW (ref 32–36)
MCV RBC AUTO: 86.5 FL — SIGNIFICANT CHANGE UP (ref 80–100)
MONOCYTES # BLD AUTO: 0.92 K/UL — HIGH (ref 0–0.9)
MONOCYTES NFR BLD AUTO: 6.1 % — SIGNIFICANT CHANGE UP (ref 2–14)
NEUTROPHILS # BLD AUTO: 13.14 K/UL — HIGH (ref 1.8–7.4)
NEUTROPHILS NFR BLD AUTO: 87.2 % — HIGH (ref 43–77)
NRBC # BLD: 0 /100 WBCS — SIGNIFICANT CHANGE UP (ref 0–0)
OB PNL STL: POSITIVE
OTHER CELLS CSF MANUAL: 2 ML/DL — LOW (ref 18–22)
PCO2 BLDV: 41 MMHG — SIGNIFICANT CHANGE UP (ref 35–50)
PH BLDV: 7.38 — SIGNIFICANT CHANGE UP (ref 7.35–7.45)
PLATELET # BLD AUTO: 483 K/UL — HIGH (ref 150–400)
PO2 BLDV: <20 MMHG — LOW (ref 25–45)
POTASSIUM BLDV-SCNC: 3.8 MMOL/L — SIGNIFICANT CHANGE UP (ref 3.5–5.3)
POTASSIUM SERPL-MCNC: 4.3 MMOL/L — SIGNIFICANT CHANGE UP (ref 3.5–5.3)
POTASSIUM SERPL-SCNC: 4.3 MMOL/L — SIGNIFICANT CHANGE UP (ref 3.5–5.3)
PROT SERPL-MCNC: 6.3 G/DL — SIGNIFICANT CHANGE UP (ref 6–8.3)
PROTHROM AB SERPL-ACNC: 12.9 SEC — SIGNIFICANT CHANGE UP (ref 10–12.9)
RBC # BLD: 3.04 M/UL — LOW (ref 3.8–5.2)
RBC # FLD: 15.1 % — HIGH (ref 10.3–14.5)
SAO2 % BLDV: 14 % — LOW (ref 67–88)
SODIUM SERPL-SCNC: 141 MMOL/L — SIGNIFICANT CHANGE UP (ref 135–145)
TROPONIN T, HIGH SENSITIVITY RESULT: 16 NG/L — SIGNIFICANT CHANGE UP (ref 0–51)
WBC # BLD: 15.06 K/UL — HIGH (ref 3.8–10.5)
WBC # FLD AUTO: 15.06 K/UL — HIGH (ref 3.8–10.5)

## 2019-11-03 PROCEDURE — 71045 X-RAY EXAM CHEST 1 VIEW: CPT | Mod: 26

## 2019-11-03 PROCEDURE — 99223 1ST HOSP IP/OBS HIGH 75: CPT

## 2019-11-03 PROCEDURE — 73090 X-RAY EXAM OF FOREARM: CPT | Mod: 26,LT

## 2019-11-03 PROCEDURE — 25605 CLTX DST RDL FX/EPHYS SEP W/: CPT | Mod: RT

## 2019-11-03 PROCEDURE — 72125 CT NECK SPINE W/O DYE: CPT | Mod: 26

## 2019-11-03 PROCEDURE — 76705 ECHO EXAM OF ABDOMEN: CPT | Mod: 26

## 2019-11-03 PROCEDURE — 99291 CRITICAL CARE FIRST HOUR: CPT

## 2019-11-03 PROCEDURE — 71260 CT THORAX DX C+: CPT | Mod: 26

## 2019-11-03 PROCEDURE — 73110 X-RAY EXAM OF WRIST: CPT | Mod: 26,RT

## 2019-11-03 PROCEDURE — 70450 CT HEAD/BRAIN W/O DYE: CPT | Mod: 26

## 2019-11-03 PROCEDURE — 73562 X-RAY EXAM OF KNEE 3: CPT | Mod: 26,RT

## 2019-11-03 PROCEDURE — 74177 CT ABD & PELVIS W/CONTRAST: CPT | Mod: 26

## 2019-11-03 PROCEDURE — 73502 X-RAY EXAM HIP UNI 2-3 VIEWS: CPT | Mod: 26,RT

## 2019-11-03 RX ORDER — ACETAMINOPHEN 500 MG
650 TABLET ORAL ONCE
Refills: 0 | Status: DISCONTINUED | OUTPATIENT
Start: 2019-11-03 | End: 2019-11-03

## 2019-11-03 RX ORDER — ACETAMINOPHEN 500 MG
650 TABLET ORAL ONCE
Refills: 0 | Status: COMPLETED | OUTPATIENT
Start: 2019-11-03 | End: 2019-11-03

## 2019-11-03 RX ORDER — FAMOTIDINE 10 MG/ML
20 INJECTION INTRAVENOUS DAILY
Refills: 0 | Status: DISCONTINUED | OUTPATIENT
Start: 2019-11-03 | End: 2019-11-04

## 2019-11-03 RX ORDER — ATORVASTATIN CALCIUM 80 MG/1
40 TABLET, FILM COATED ORAL AT BEDTIME
Refills: 0 | Status: DISCONTINUED | OUTPATIENT
Start: 2019-11-03 | End: 2019-11-04

## 2019-11-03 RX ORDER — MORPHINE SULFATE 50 MG/1
2 CAPSULE, EXTENDED RELEASE ORAL EVERY 4 HOURS
Refills: 0 | Status: DISCONTINUED | OUTPATIENT
Start: 2019-11-03 | End: 2019-11-04

## 2019-11-03 RX ORDER — ACETAMINOPHEN 500 MG
975 TABLET ORAL EVERY 6 HOURS
Refills: 0 | Status: DISCONTINUED | OUTPATIENT
Start: 2019-11-03 | End: 2019-11-04

## 2019-11-03 RX ORDER — BUPROPION HYDROCHLORIDE 150 MG/1
150 TABLET, EXTENDED RELEASE ORAL DAILY
Refills: 0 | Status: DISCONTINUED | OUTPATIENT
Start: 2019-11-03 | End: 2019-11-04

## 2019-11-03 RX ORDER — SODIUM CHLORIDE 9 MG/ML
1000 INJECTION INTRAMUSCULAR; INTRAVENOUS; SUBCUTANEOUS ONCE
Refills: 0 | Status: COMPLETED | OUTPATIENT
Start: 2019-11-03 | End: 2019-11-03

## 2019-11-03 RX ORDER — AMLODIPINE BESYLATE 2.5 MG/1
2.5 TABLET ORAL DAILY
Refills: 0 | Status: DISCONTINUED | OUTPATIENT
Start: 2019-11-03 | End: 2019-11-04

## 2019-11-03 RX ORDER — ONDANSETRON 8 MG/1
4 TABLET, FILM COATED ORAL ONCE
Refills: 0 | Status: COMPLETED | OUTPATIENT
Start: 2019-11-03 | End: 2019-11-03

## 2019-11-03 RX ORDER — MORPHINE SULFATE 50 MG/1
2 CAPSULE, EXTENDED RELEASE ORAL ONCE
Refills: 0 | Status: DISCONTINUED | OUTPATIENT
Start: 2019-11-03 | End: 2019-11-03

## 2019-11-03 RX ORDER — FLUVASTATIN SODIUM 80 MG/1
0 TABLET, FILM COATED, EXTENDED RELEASE ORAL
Qty: 0 | Refills: 0 | DISCHARGE

## 2019-11-03 RX ORDER — ACETAMINOPHEN 500 MG
1000 TABLET ORAL ONCE
Refills: 0 | Status: DISCONTINUED | OUTPATIENT
Start: 2019-11-03 | End: 2019-11-03

## 2019-11-03 RX ORDER — PANTOPRAZOLE SODIUM 20 MG/1
80 TABLET, DELAYED RELEASE ORAL ONCE
Refills: 0 | Status: COMPLETED | OUTPATIENT
Start: 2019-11-03 | End: 2019-11-03

## 2019-11-03 RX ADMIN — Medication 650 MILLIGRAM(S): at 15:53

## 2019-11-03 RX ADMIN — PANTOPRAZOLE SODIUM 80 MILLIGRAM(S): 20 TABLET, DELAYED RELEASE ORAL at 11:43

## 2019-11-03 RX ADMIN — Medication 975 MILLIGRAM(S): at 19:07

## 2019-11-03 RX ADMIN — ONDANSETRON 4 MILLIGRAM(S): 8 TABLET, FILM COATED ORAL at 12:55

## 2019-11-03 RX ADMIN — Medication 260 MILLIGRAM(S): at 12:56

## 2019-11-03 RX ADMIN — Medication 975 MILLIGRAM(S): at 18:37

## 2019-11-03 RX ADMIN — MORPHINE SULFATE 2 MILLIGRAM(S): 50 CAPSULE, EXTENDED RELEASE ORAL at 15:54

## 2019-11-03 RX ADMIN — SODIUM CHLORIDE 1000 MILLILITER(S): 9 INJECTION INTRAMUSCULAR; INTRAVENOUS; SUBCUTANEOUS at 11:43

## 2019-11-03 RX ADMIN — Medication 975 MILLIGRAM(S): at 23:29

## 2019-11-03 RX ADMIN — Medication 975 MILLIGRAM(S): at 22:59

## 2019-11-03 RX ADMIN — MORPHINE SULFATE 2 MILLIGRAM(S): 50 CAPSULE, EXTENDED RELEASE ORAL at 14:22

## 2019-11-03 RX ADMIN — ATORVASTATIN CALCIUM 40 MILLIGRAM(S): 80 TABLET, FILM COATED ORAL at 22:58

## 2019-11-03 NOTE — ED PROVIDER NOTE - PHYSICAL EXAMINATION
Vital signs reviewed.  CONSTITUTIONAL: pale, cool, in moderate distress.  HEAD: Normocephalic; atraumatic  EYES: EOMI, no nystagmus, no conjunctival injection, no scleral icterus, +pale conjunctiva  EARS: no apparent discharge, pinna normal  NOSE: Septum midline, no rhinorrhea  MOUTH/THROAT:  MMM  NECK: Trachea midline, no JVD.  +T spine and L spine tenderness  CV: Normal S1, S2; no audible murmurs; extremities WWP  RESP: normal work of breathing; CTAB, no stridor.  +R chest wall tender to palpation  ABD: firm, slightly distended in suprapubic area; exquisitely tender in abdomen worse suprapubic and LLQ, +guarding.  FOBT+ with gross dark stools  : Deferred  MSK/EXT: no edema, normal ROM in all four extremities, right wrist deformity present but neurovascularly intact  SKIN: Very pale  NEURO: aaox2, moving all extremities purposefully and spontaneously, awake  PSYCH: No psychomotor agitation, no evident response to internal stimuli.

## 2019-11-03 NOTE — ED PROVIDER NOTE - SECONDARY DIAGNOSIS.
Wrist injury, right, initial encounter Gastrointestinal hemorrhage, unspecified gastrointestinal hemorrhage type Acute pain of right hip Lower abdominal pain

## 2019-11-03 NOTE — ED ADULT NURSE NOTE - OBJECTIVE STATEMENT
Pt is an ambulatory 89 yr old female.   Initial documentation was done on a trauma flowsheet for the purposes of the level one trauma activation.  For trauma care see trauma flow sheet.  Pt was BIBA after being found down with dark stools for unknown time in bathroom>  PERRL wnl, zuñiga with weakness in right upper and lower extremity.  NSR on cm at 77 bpm, however, after the initial trauma work up patient became sinus bradycardia at 40 bpm at 1230.  Denies chest pain and sob.  Abdomen is tender in all fields, firm and rigid.  FAST is negative at bedside.  Pelvis is stable, with pain on the right side.  No urinary symptoms.  Peripheral pulses +2bl no edema

## 2019-11-03 NOTE — GOALS OF CARE CONVERSATION - ADVANCED CARE PLANNING - CONVERSATION DETAILS
Full note to follow [place]    Topic discussed previously    Yes []      No []    Complexity        Low[]              Medium [x]      High []       Unknown []    Potential barriers to expeditious decision making: Patient has h/o dementia, however would like to participate in decision making process. However, daughter/HCP Virgen Craig is having difficult time allowing the patient to have medical information for fear that it will upset and depress her.      Provider: Dr. Taz Waite    Summary:   As per discussion with patient, she wishes to participate in decision-making process, would like that all information be shared with her and not solely her daughter (Virgen Craig) although she is her HCP. Patient was consistent in her wishes when asked multiple times by palliative medicine attending.     Of note: Patient's daughter was hesitant about sharing medical information with her mother in part due to historical context of the patient not sharing her own mother's cancer diagnosis with her mother. Patient's daughter revealed that during past medical visits with PCP (Dr. PEGGY Sotelo), that she made the decision to not pursue medical investigation of possible lung mass and liver mass without informing her mother. Patient's daughter revealed that she would like to readdress MOLST form options such as IV fluids. Patient is DNR/DNI at present time. Patient's daughter expressed wishes for comfort care approach, no extraordinary measures or aggressive/invasive measures are to be taken.      Follow up: MOLST documentation, On-going GOC, consider degree to which patient may participate in medical decision-making process [place]    Topic discussed previously    Yes []      No []    Complexity        Low[]              Medium [x]      High []       Unknown []    Potential barriers to expeditious decision making: Patient has h/o dementia, however would like to participate in decision making process. However, daughter/HCP Virgen Craig is having difficult time allowing the patient to have medical information for fear that it will upset and depress her.      Provider: Dr. Taz Waite    Summary:   As per discussion with patient, she wishes to participate in decision-making process, would like that all information be shared with her and not solely her daughter (Virgen Craig) although she is her HCP. Patient was consistent in her wishes when asked multiple times by palliative medicine attending.     Of note: Patient's daughter was hesitant about sharing medical information with her mother in part due to historical context of the patient not sharing her own mother's cancer diagnosis with her mother. Patient's daughter revealed that during past medical visits  that she made the decision to not pursue medical investigation of possible lung mass and liver mass without informing her mother. Patient's daughter revealed that she would like to readdress MOLST form options such as IV fluids. Patient is DNR/DNI at present time. Patient's daughter expressed wishes for comfort care approach, no extraordinary measures or aggressive/invasive measures are to be taken.      Follow up: MOLST documentation, On-going GOC, consider degree to which patient may participate in medical decision-making process

## 2019-11-03 NOTE — CONSULT NOTE ADULT - SUBJECTIVE AND OBJECTIVE BOX
Complete note to follow HPI:  89 y/o F w/ PMHx of dementia, HTN, CAD, depression, brought in after a fall from standing, upgraded to level 1 trauma activation for hypotension. Per the patient and her daughter, the patient was found down on the floor after a fall today. She denies head trauma and is unclear if she lost consciousness. She does not clearly recall the events surrounding her fall, but does believe she was having a bowel movement immediately prior. Her main complaints are right-sided abdominal/hip pain and right wrist pain.     On arrival to the ED, primary survey intact. GCS 15. Secondary notable for tenderness of the right chest wall, right wrist, lower abdomen, and right hip. (03 Nov 2019 14:35)    PERTINENT PM/SXH:   Cataract  Thoracic Back Pain  Upper Back Pain  Rib Fracture left side  - from fall one months ago  History of Osteoporosis  HTN (Hypertension)  Hypertriglyceridemia  GERD (Gastroesophageal Reflux Disease)  AI (Aortic Incompetence)    Cataract, repair  - right  - 7/10  History of Colonoscopy  History of Tonsillectomy  History of Aortic Valve Replacement 2001    FAMILY HISTORY:  No pertinent family history in first degree relatives    ITEMS NOT CHECKED ARE NOT PRESENT    SOCIAL HISTORY:   Significant other/partner:  [x]  Children:  [ ]  Confucianism/Spirituality:  Substance hx:  [ ]   Tobacco hx:  [ ]   Alcohol hx: [ ]   Home Opioid hx:  [ ] I-Stop Reference No:  Living Situation: [ ]Home  [x]Long term care  [ ]Rehab [ ]Other    ADVANCE DIRECTIVES:    DNR  Yes  MOLST  [x]  Living Will  [x]   DECISION MAKER(s): Patient  [x] Health Care Proxy(s) Virgen Craig (daughter), Frantz Craig (grand-daughter)  [ ] Surrogate(s)  [ ] Guardian           Name(s): Phone Number(s):    BASELINE (I)ADL(s) (prior to admission): able to ambulate with cane, had HHA for several hours/day  [place]: []Total  [x] Moderate [ ]Dependent    Allergies    No Known Allergies    Intolerances    MEDICATIONS  (STANDING):  acetaminophen   Tablet .. 975 milliGRAM(s) Oral every 6 hours  amLODIPine   Tablet 2.5 milliGRAM(s) Oral daily  atorvastatin 40 milliGRAM(s) Oral at bedtime  buPROPion XL . 150 milliGRAM(s) Oral daily  famotidine    Tablet 20 milliGRAM(s) Oral daily    MEDICATIONS  (PRN):  morphine  - Injectable 2 milliGRAM(s) IV Push every 4 hours PRN Severe Pain (7 - 10)    PRESENT SYMPTOMS: [ ]Unable to obtain due to poor mentation   Source if other than patient:  [ ]Family   [ ]Team     Pain: [x] yes [ ] no  QOL impact - severe, unable to ambulate or perform ADL at present time  Location - right wrist and hip  Aggravating factors - movement  Quality - unable to describe  Radiation - unable to describe  Timing- constant  Severity (0-10 scale): unable to quantify  Minimal acceptable level (0-10 scale):     PAIN AD Score: 7    [email or web address] (press ctrl +  left click to view)    Dyspnea:                           [ ]Mild [ ]Moderate [ ]Severe  Anxiety:                             [ ]Mild [x]Moderate [ ]Severe  Fatigue:                             [ ]Mild [ ]Moderate [ ]Severe  Nausea:                             [ ]Mild [ ]Moderate [ ]Severe  Loss of appetite:              [ ]Mild [ ]Moderate [ ]Severe  Constipation:                    [ ]Mild [ ]Moderate [ ]Severe    Other Symptoms: Unable to respond due to distracting injury  []All other review of systems negative     Karnofsky Performance Score/Palliative Performance Status Version 2:  30-40%    http://npcrc.org/files/news/palliative_performance_scale_ppsv2.pdf  PHYSICAL EXAM:  Vital Signs Last 24 Hrs  T(C): 36.3 (03 Nov 2019 16:25), Max: 37.1 (03 Nov 2019 13:30)  T(F): 97.4 (03 Nov 2019 16:25), Max: 98.7 (03 Nov 2019 13:30)  HR: 73 (03 Nov 2019 16:25) (39 - 73)  BP: 145/72 (03 Nov 2019 16:25) (144/81 - 172/78)  BP(mean): --  RR: 16 (03 Nov 2019 16:25) (16 - 18)  SpO2: 100% (03 Nov 2019 16:25) (98% - 100%) I&O's Summary    GENERAL: elderly female, hard of hearing, evident pallor, in acute distress due to pain  [x]Alert  [x]Oriented x2-3   [ ]Lethargic  [ ]Cachexia  [ ]Unarousable  [x]Verbal  [ ]Non-Verbal  Behavioral:   [ ] Anxiety  [ ] Delirium [x] Agitation [ ] Other  HEENT:  [ ]Normal    [x]Dry mouth   [ ]ET Tube/Trach  [ ]Oral lesions  PULMONARY:   []Clear []Tachypnea  [ ]Audible excessive secretions   [ ]Rhonchi        [ ]Right [ ]Left [ ]Bilateral  [ ]Crackles        [ ]Right [ ]Left [ ]Bilateral  [ ]Wheezing     [ ]Right [ ]Left [ ]Bilateral  CARDIOVASCULAR:    [x]Regular [ ]Irregular [ ]Tachy  []Brannon [ ]Murmur [ ]Other  GASTROINTESTINAL:  []Soft  [ ]Distended   [x]+BS  [ ]Non tender [ ]Tender  [ ]PEG [ ]OGT/ NGT  Last BM:   GENITOURINARY:  [ ]Normal [ ] Incontinent   [ ]Oliguria/Anuria   [ ]Vidal  MUSCULOSKELETAL:   [ ]Normal   [ ]Weakness  [ ]Bed/Wheelchair bound [ ]Edema  NEUROLOGIC:   [ ]No focal deficits  [ ] Cognitive impairment  [ ] Dysphagia [ ]Dysarthria [ ] Paresis [ ]Other   SKIN: Pallor  [ ]Normal   [ ]Pressure ulcer(s)  [ ]Rash    CRITICAL CARE:  [ ] Shock Present  [ ]Septic [ ]Cardiogenic [ ]Neurologic [ ]Hypovolemic  [ ]  Vasopressors [ ]  Inotropes   [ ] Respiratory failure present [ ] mechanical ventilation [ ] non-invasive ventilatory support [ ] High flow  [ ] Acute  [ ] Chronic [ ] Hypoxic  [ ] Hypercarbic [ ] Other  [ ] Other organ failure     LABS:                        8.0    15.06 )-----------( 483      ( 03 Nov 2019 11:44 )             26.3   11-03    141  |  105  |  38<H>  ----------------------------<  211<H>  4.3   |  22  |  0.87    Ca    8.5      03 Nov 2019 11:44    TPro  6.3  /  Alb  3.6  /  TBili  0.5  /  DBili  x   /  AST  22  /  ALT  20  /  AlkPhos  97  11-03  PT/INR - ( 03 Nov 2019 11:44 )   PT: 12.9 sec;   INR: 1.13 ratio         PTT - ( 03 Nov 2019 11:44 )  PTT:22.5 sec      RADIOLOGY & ADDITIONAL STUDIES:  < from: Xray Wrist 3 Views, Right (11.03.19 @ 15:04) >  INTERPRETATION:  Acute fracture of the distal radial metadyiaphysis with   apex volar angulation. fracture of distal lateral aspect of ulna with   adjacent soft tissue swelling.    < from: Xray Hip 2-3 Views, Right (11.03.19 @ 15:04) >  INTERPRETATION:  density is seen inferior to the right femoral head on   interally rotated view. recommend clinical correlation for fracture.   redemosntrated fractures through the right superior and inferio pubic   symphysis.    < from: Xray Chest 1 View AP/PA (11.03.19 @ 15:03) >  INTERPRETATION:  no focal consolidatino. chronic left rib fractures.   chest is better evualted on earlier same day chest ct      < from: CT Abdomen and Pelvis w/ IV Cont (11.03.19 @ 12:19) >  EXAM:  CT ABDOMEN AND PELVIS IC                            *** ADDENDUM 11/03/2019  ***    A comminuted fractures of the right superior and inferior pubic tubercles.    Findings were discussed with Dr. Escobar from emergency department at 3:30   by Dr. Garrison with read back confirmation.      *** END OF ADDENDUM 11/03/2019  ***      PROCEDURE DATE:  11/03/2019            INTERPRETATION:  CLINICAL INFORMATION: Patient presents status post   syncope and fall complaining of bloody stools.    CHEST:     LUNGS AND LARGE AIRWAYS: Patent central airways. Right lower lobe   pulmonary nodule measuring 1.5 cm (2, 59) is unchanged since 2017. A   groundglass nodule in the superior segment of the left lower lobe   measures 5 mm (2, 39) and is new when compared to prior study. Right   lower lobe calcified granuloma. Bibasilar subsegmental atelectasis.  PLEURA: No pleural effusion.  VESSELS: Atherosclerotic changes of the aorta.  HEART: Heart size is normal. Status post aortic valve replacement. No   pericardial effusion.  MEDIASTINUM AND ESTRELLA: No lymphadenopathy.  CHEST WALL AND LOWER NECK: Status post median sternotomy. Subcutaneous   loop recorder present.    ABDOMEN AND PELVIS:    LIVER: A right hepatic lobe hemangioma measuring 3.8 cm, unchanged since   2016. Additional subcentimeter foci too small tocharacterize.  BILE DUCTS: Normal caliber.  GALLBLADDER: Within normal limits.  SPLEEN: Within normal limits.  PANCREAS: Within normal limits.  ADRENALS: Within normal limits.  KIDNEYS/URETERS: Right renal cysts, largest interpolar cyst measuring up   to 2.4 cm. No hydronephrosis.    BLADDER: Within normal limits.  REPRODUCTIVE ORGANS: Fibroid uterus.    BOWEL: No high-grade bowel obstruction. Diffuse wall thickening and   pericolonic fat stranding of the descending colon and sigmoid likely   representing colitis. Sigmoid diverticulosis. Additionally short segment   focal wall thickening is visualized in the mid transverse colon (3, 60   and 606, 16) concerning for an underlying mass.  PERITONEUM: Trace ascites. No drainable fluid collection or   pneumoperitoneum.  VESSELS: Atherosclerotic changes.  RETROPERITONEUM/LYMPH NODES: No lymphadenopathy.    ABDOMINAL WALL: Within normal limits.  BONES: Chronic superior endplate compression deformity of T8 and T6   vertebral bodies, as seen in prior CT chest 6/7/2017. Age indeterminate   left lateral ninth and 10th rib fractures. No acute fracture. Grade 1   anterolisthesis of L4 on L5. Degenerative changes.    IMPRESSION:     Diffuse colitis involving the descending and sigmoid colon with   pericolonic fat stranding.    Short segment focal wall thickening of the mid transverse colon   concerning for underlying mass. Colonoscopy is recommended for further   evaluation.    Trace ascites.    Age indeterminate left lateral ninth and 10th ribfractures.    < from: CT Cervical Spine No Cont (11.03.19 @ 12:19) >  IMPRESSION:     HEAD: No CT evidence of acute intracranial hemorrhage, brain edema, or   mass effect.Age-appropriate involutional changes.    CERVICAL SPINE: No evidence for acute displaced fracture or traumatic   malalignment. Cervical degenerative spondylosis, as described above.    < from: 12 Lead ECG (07.30.17 @ 11:57) >  Ventricular Rate 64 BPM    Atrial Rate 64 BPM    P-R Interval 176 ms    QRS Duration 84 ms     ms    QTc 408 ms    P Axis 10 degrees    R Axis -30 degrees    T Axis 89 degrees    Diagnosis Line NORMAL SINUS RHYTHM  LEFT AXIS DEVIATION  SEPTALINFARCT , AGE UNDETERMINED  ABNORMAL ECG      PROTEIN CALORIE MALNUTRITION PRESENT: [ ] Yes [ ] No  [x] PPSV2 < or = to 30% [ ] significant weight loss  [ ] poor nutritional intake [ ] catabolic state [ ] anasarca     Albumin, Serum: 3.6 g/dL (11-03-19 @ 11:44)  Artificial Nutrition [ ]     REFERRALS:   [ ]Chaplaincy  [ ] Hospice  [ ]Child Life  [ ]Social Work  [ ]Case management [ ]Holistic Therapy HPI:  89 y/o F w/ PMHx of dementia, HTN, CAD, depression, brought in after a fall from standing, upgraded to level 1 trauma activation for hypotension. Per the patient and her daughter, the patient was found down on the floor after a fall today. She denies head trauma and is unclear if she lost consciousness. She does not clearly recall the events surrounding her fall, but does believe she was having a bowel movement immediately prior. Her main complaints are right-sided abdominal/hip pain and right wrist pain.     On arrival to the ED, primary survey intact. GCS 15. Secondary notable for tenderness of the right chest wall, right wrist, lower abdomen, and right hip. (03 Nov 2019 14:35)    PERTINENT PM/SXH:   Cataract  Thoracic Back Pain  Upper Back Pain  Rib Fracture left side  - from fall one months ago  History of Osteoporosis  HTN (Hypertension)  Hypertriglyceridemia  GERD (Gastroesophageal Reflux Disease)  AI (Aortic Incompetence)    Cataract, repair  - right  - 7/10  History of Colonoscopy  History of Tonsillectomy  History of Aortic Valve Replacement 2001    FAMILY HISTORY:  No pertinent family history in first degree relatives    ITEMS NOT CHECKED ARE NOT PRESENT    SOCIAL HISTORY:   Significant other/partner:  [x]  Children:  [ ]  Confucianist/Spirituality:  Substance hx:  [ ]   Tobacco hx:  [ ]   Alcohol hx: [ ]   Home Opioid hx:  [ ] I-Stop Reference No:  Living Situation: [ ]Home  [x]Long term care  [ ]Rehab [ ]Other    ADVANCE DIRECTIVES:    DNR  Yes  MOLST  [x]  Living Will  [x]   DECISION MAKER(s): Patient  [x] Health Care Proxy(s) Virgen Craig (daughter [phone]), Frantz Craig (grand-daughter)  [ ] Surrogate(s)  [ ] Guardian           Name(s): Phone Number(s):    BASELINE (I)ADL(s) (prior to admission): able to ambulate with cane, had HHA for several hours/day  [place]: []Total  [x] Moderate [ ]Dependent    Allergies    No Known Allergies    Intolerances    MEDICATIONS  (STANDING):  acetaminophen   Tablet .. 975 milliGRAM(s) Oral every 6 hours  amLODIPine   Tablet 2.5 milliGRAM(s) Oral daily  atorvastatin 40 milliGRAM(s) Oral at bedtime  buPROPion XL . 150 milliGRAM(s) Oral daily  famotidine    Tablet 20 milliGRAM(s) Oral daily    MEDICATIONS  (PRN):  morphine  - Injectable 2 milliGRAM(s) IV Push every 4 hours PRN Severe Pain (7 - 10)    PRESENT SYMPTOMS: [ ]Unable to obtain due to poor mentation   Source if other than patient:  [ ]Family   [ ]Team     Pain: [x] yes [ ] no  QOL impact - severe, unable to ambulate or perform ADL at present time  Location - right wrist and hip  Aggravating factors - movement  Quality - unable to describe  Radiation - unable to describe  Timing- constant  Severity (0-10 scale): unable to quantify  Minimal acceptable level (0-10 scale):     PAIN AD Score: 7    [email or web address] (press ctrl +  left click to view)    Dyspnea:                           [ ]Mild [ ]Moderate [ ]Severe  Anxiety:                             [ ]Mild [x]Moderate [ ]Severe  Fatigue:                             [ ]Mild [ ]Moderate [ ]Severe  Nausea:                             [ ]Mild [ ]Moderate [ ]Severe  Loss of appetite:              [ ]Mild [ ]Moderate [ ]Severe  Constipation:                    [ ]Mild [ ]Moderate [ ]Severe    Other Symptoms: Unable to respond due to distracting injury  []All other review of systems negative     Karnofsky Performance Score/Palliative Performance Status Version 2:  30-40%    http://npcrc.org/files/news/palliative_performance_scale_ppsv2.pdf  PHYSICAL EXAM:  Vital Signs Last 24 Hrs  T(C): 36.3 (03 Nov 2019 16:25), Max: 37.1 (03 Nov 2019 13:30)  T(F): 97.4 (03 Nov 2019 16:25), Max: 98.7 (03 Nov 2019 13:30)  HR: 73 (03 Nov 2019 16:25) (39 - 73)  BP: 145/72 (03 Nov 2019 16:25) (144/81 - 172/78)  BP(mean): --  RR: 16 (03 Nov 2019 16:25) (16 - 18)  SpO2: 100% (03 Nov 2019 16:25) (98% - 100%) I&O's Summary    GENERAL: elderly female, hard of hearing, evident pallor, in acute distress due to pain  [x]Alert  [x]Oriented x2-3   [ ]Lethargic  [ ]Cachexia  [ ]Unarousable  [x]Verbal  [ ]Non-Verbal  Behavioral:   [ ] Anxiety  [ ] Delirium [x] Agitation [ ] Other  HEENT:  [ ]Normal    [x]Dry mouth   [ ]ET Tube/Trach  [ ]Oral lesions  PULMONARY:   []Clear []Tachypnea  [ ]Audible excessive secretions   [ ]Rhonchi        [ ]Right [ ]Left [ ]Bilateral  [ ]Crackles        [ ]Right [ ]Left [ ]Bilateral  [ ]Wheezing     [ ]Right [ ]Left [ ]Bilateral  CARDIOVASCULAR:    [x]Regular [ ]Irregular [ ]Tachy  []Brannon [ ]Murmur [ ]Other  GASTROINTESTINAL:  []Soft  [ ]Distended   [x]+BS  [ ]Non tender [ ]Tender  [ ]PEG [ ]OGT/ NGT  Last BM:   GENITOURINARY:  [ ]Normal [ ] Incontinent   [ ]Oliguria/Anuria   [ ]Vidal  MUSCULOSKELETAL:   [ ]Normal   [ ]Weakness  [ ]Bed/Wheelchair bound [ ]Edema  NEUROLOGIC:   [ ]No focal deficits  [ ] Cognitive impairment  [ ] Dysphagia [ ]Dysarthria [ ] Paresis [ ]Other   SKIN: Pallor  [ ]Normal   [ ]Pressure ulcer(s)  [ ]Rash    CRITICAL CARE:  [ ] Shock Present  [ ]Septic [ ]Cardiogenic [ ]Neurologic [ ]Hypovolemic  [ ]  Vasopressors [ ]  Inotropes   [ ] Respiratory failure present [ ] mechanical ventilation [ ] non-invasive ventilatory support [ ] High flow  [ ] Acute  [ ] Chronic [ ] Hypoxic  [ ] Hypercarbic [ ] Other  [ ] Other organ failure     LABS:                        8.0    15.06 )-----------( 483      ( 03 Nov 2019 11:44 )             26.3   11-03    141  |  105  |  38<H>  ----------------------------<  211<H>  4.3   |  22  |  0.87    Ca    8.5      03 Nov 2019 11:44    TPro  6.3  /  Alb  3.6  /  TBili  0.5  /  DBili  x   /  AST  22  /  ALT  20  /  AlkPhos  97  11-03  PT/INR - ( 03 Nov 2019 11:44 )   PT: 12.9 sec;   INR: 1.13 ratio         PTT - ( 03 Nov 2019 11:44 )  PTT:22.5 sec      RADIOLOGY & ADDITIONAL STUDIES:  < from: Xray Wrist 3 Views, Right (11.03.19 @ 15:04) >  INTERPRETATION:  Acute fracture of the distal radial metadyiaphysis with   apex volar angulation. fracture of distal lateral aspect of ulna with   adjacent soft tissue swelling.    < from: Xray Hip 2-3 Views, Right (11.03.19 @ 15:04) >  INTERPRETATION:  density is seen inferior to the right femoral head on   interally rotated view. recommend clinical correlation for fracture.   redemosntrated fractures through the right superior and inferio pubic   symphysis.    < from: Xray Chest 1 View AP/PA (11.03.19 @ 15:03) >  INTERPRETATION:  no focal consolidatino. chronic left rib fractures.   chest is better evualted on earlier same day chest ct      < from: CT Abdomen and Pelvis w/ IV Cont (11.03.19 @ 12:19) >  EXAM:  CT ABDOMEN AND PELVIS IC                            *** ADDENDUM 11/03/2019  ***    A comminuted fractures of the right superior and inferior pubic tubercles.    Findings were discussed with Dr. Escobar from emergency department at 3:30   by Dr. Garrison with read back confirmation.      *** END OF ADDENDUM 11/03/2019  ***      INTERPRETATION:  CLINICAL INFORMATION: Patient presents status post   syncope and fall complaining of bloody stools.    CHEST:     LUNGS AND LARGE AIRWAYS: Patent central airways. Right lower lobe   pulmonary nodule measuring 1.5 cm (2, 59) is unchanged since 2017. A   groundglass nodule in the superior segment of the left lower lobe   measures 5 mm (2, 39) and is new when compared to prior study. Right   lower lobe calcified granuloma. Bibasilar subsegmental atelectasis.  PLEURA: No pleural effusion.  VESSELS: Atherosclerotic changes of the aorta.  HEART: Heart size is normal. Status post aortic valve replacement. No   pericardial effusion.  MEDIASTINUM AND ESTRELLA: No lymphadenopathy.  CHEST WALL AND LOWER NECK: Status post median sternotomy. Subcutaneous   loop recorder present.    ABDOMEN AND PELVIS:    LIVER: A right hepatic lobe hemangioma measuring 3.8 cm, unchanged since   2016. Additional subcentimeter foci too small tocharacterize.  BILE DUCTS: Normal caliber.  GALLBLADDER: Within normal limits.  SPLEEN: Within normal limits.  PANCREAS: Within normal limits.  ADRENALS: Within normal limits.  KIDNEYS/URETERS: Right renal cysts, largest interpolar cyst measuring up   to 2.4 cm. No hydronephrosis.    BLADDER: Within normal limits.  REPRODUCTIVE ORGANS: Fibroid uterus.    BOWEL: No high-grade bowel obstruction. Diffuse wall thickening and   pericolonic fat stranding of the descending colon and sigmoid likely   representing colitis. Sigmoid diverticulosis. Additionally short segment   focal wall thickening is visualized in the mid transverse colon (3, 60   and 606, 16) concerning for an underlying mass.  PERITONEUM: Trace ascites. No drainable fluid collection or   pneumoperitoneum.  VESSELS: Atherosclerotic changes.  RETROPERITONEUM/LYMPH NODES: No lymphadenopathy.    ABDOMINAL WALL: Within normal limits.  BONES: Chronic superior endplate compression deformity of T8 and T6   vertebral bodies, as seen in prior CT chest 6/7/2017. Age indeterminate   left lateral ninth and 10th rib fractures. No acute fracture. Grade 1   anterolisthesis of L4 on L5. Degenerative changes.    IMPRESSION:     Diffuse colitis involving the descending and sigmoid colon with   pericolonic fat stranding.    Short segment focal wall thickening of the mid transverse colon   concerning for underlying mass. Colonoscopy is recommended for further   evaluation.    Trace ascites.    Age indeterminate left lateral ninth and 10th ribfractures.    < from: CT Cervical Spine No Cont (11.03.19 @ 12:19) >  IMPRESSION:     HEAD: No CT evidence of acute intracranial hemorrhage, brain edema, or   mass effect.Age-appropriate involutional changes.    CERVICAL SPINE: No evidence for acute displaced fracture or traumatic   malalignment. Cervical degenerative spondylosis, as described above.    < from: 12 Lead ECG (07.30.17 @ 11:57) >  Ventricular Rate 64 BPM    Atrial Rate 64 BPM    P-R Interval 176 ms    QRS Duration 84 ms     ms    QTc 408 ms    P Axis 10 degrees    R Axis -30 degrees    T Axis 89 degrees    Diagnosis Line NORMAL SINUS RHYTHM  LEFT AXIS DEVIATION  SEPTALINFARCT , AGE UNDETERMINED  ABNORMAL ECG      PROTEIN CALORIE MALNUTRITION PRESENT: [ ] Yes [ ] No  [x] PPSV2 < or = to 30% [ ] significant weight loss  [ ] poor nutritional intake [ ] catabolic state [ ] anasarca     Albumin, Serum: 3.6 g/dL (11-03-19 @ 11:44)  Artificial Nutrition [ ]     REFERRALS:   [ ]Chaplaincy  [ ] Hospice  [ ]Child Life  [ ]Social Work  [ ]Case management [ ]Holistic Therapy

## 2019-11-03 NOTE — H&P ADULT - NSICDXPASTSURGICALHX_GEN_ALL_CORE_FT
PAST SURGICAL HISTORY:  Cataract, repair  - right  - 7/10     History of Aortic Valve Replacement 2001     History of Colonoscopy     History of Tonsillectomy

## 2019-11-03 NOTE — CONSULT NOTE ADULT - ATTENDING COMMENTS
Pt seen and examined.  Exam and plan as above
The patient was seen and examined  Annotations are embedded above  Predominant symptom(s) Pain  Relevant factors: Extensive discussion held with the daughter and son surrounding shared decision making model and informed consent.  We explored their concerns about the psychosocial impact of these diagnoses/discussions.  We discussed the importance and necessity of pt assessment. There was resistance to the idea from the daughter, and we sought to understand her reluctance.  Although shr cites medical reasons, she also feels that since the patient did that for her own mother, she ought to do that for her.Pt was consistent repeatedly about decision making  preferences.  The encounter was interrupted and ended by the need for her to go for imaging.  Recommendations:  Serial APAP  IV morphine 1mg q4H PRN  Complete MOLST  Action Items: f/u with patient            In the event of newly developing, evolving, or worsening symptoms, please contact the Palliative Medicine team via pager (if the patient is at [facility] #[phone] or if the patient is at [facility] #[phone]) The Geriatric and Palliative Medicine service has coverage 24 hours a day/ 7 days a week to provide medical recommendations regarding symptom management needs        Taz Waite MD   of Geriatric and Palliative Medicine  [facility]  Please contact the following number for clinical matters: [phone]

## 2019-11-03 NOTE — H&P ADULT - HISTORY OF PRESENT ILLNESS
89F pmhx dementia, HTN, CAD, depression, brought in after a fall from standing, upgraded to level 1 trauma activation for hypotension. Per the patient and her daughter, the patient was found down on the floor after a fall today. She denies head trauma and is unclear if she lost consciousness. She does not clearly recall the events surrounding her fall, but does believe she was having a bowel movement immediately prior. Her main complaints are right-sided abdominal/hip pain and right wrist pain.     On arrival to the ED, primary survey intact. GCS 15. Secondary notable for tenderness of the right chest wall, right wrist, lower abdomen, and right hip.

## 2019-11-03 NOTE — CONSULT NOTE ADULT - PROBLEM SELECTOR RECOMMENDATION 9
Severe pain most likely 2/2 wrist fracture and hip discomfort s/p fall. Patient cannot recall events leading up to fall.     -Consider Tylenol 1 gm IV q8hr  -Consider Morphine 1 mg IV q4hr prn pain  -Ensure bowel regimen to prevent opioid induced constipation Severe pain most likely 2/2 wrist fracture and hip discomfort s/p fall. Official imaging reports pending. Patient cannot recall events leading up to fall.     -Consider Tylenol 1 gm IV q8hr  -Consider Morphine 1 mg IV q4hr prn pain  -Ensure bowel regimen to prevent opioid induced constipation  -Will adjust dosing based on 24 hour requirement and symptom burden PPSV 30%, required total RN care at present time.

## 2019-11-03 NOTE — CONSULT NOTE ADULT - PROBLEM SELECTOR RECOMMENDATION 7
Palliative care consulted for symptom management, particularly pain, GOC and complex decision-making process.    Argentina Saez MD  Palliative Medicine Fellow On-Call    In the event of newly developing, evolving, or worsening symptoms, please contact the Palliative Medicine team via pager (if the patient is at [facility] #[phone] or if the patient is at [facility] #[phone]) The Geriatric and Palliative Medicine service has coverage 24 hours a day/ 7 days a week to provide medical recommendations regarding symptom management needs

## 2019-11-03 NOTE — H&P ADULT - ATTENDING COMMENTS
Patient seen and examined and agree with above.  89 year old female with constant falls who presented as melena. A level 1 trauma activation was called as the patient was found down. Level 1 called for brief hypotension. Given the melena, 1 unit PRBC given. Airway was patent. Bilateral breath sounds. 2 large bore Ivs placed.  GCS 15  On secondary survey the patient's hemodynamics improved to SBP of 180s.   She had an obvious deformity of right wrist.  She complaints of chest and right hip pain too.    Ct scan scans of head, c spine, chest/abdomen/pelvis -   -negative for traumatic brain injury  -Diffuse colitis involving the descending and sigmoid colon with   pericolonic fat stranding.  -Short segment focal wall thickening of the mid transverse colon   concerning for underlying mass. Colonoscopy is recommended for further   evaluation.  Trace ascites.  Age indeterminate left lateral ninth and 10th rib fractures.    Labs reviewed have a leukocytosis of 15 with a positive fecal occult blood.  She is anemic with H/H 8/26 and as per her daughter her last H/H 11    On the way to XR the patient became bradycardic to 36.   I discussed goals of care with her daughter and niece who say they do not want any interventions down. She always complains of pain and her dementia has been worsening over the last year. I discussed that she is bleeding but they would not want any intervention performed. They do not want compressions or even chemical resuscitation and would never want her intubated on mechanical ventilation. They have known about a pulmonary nodule for the last couple of years and they have not wanted surgery for this. They want her to be pain free as she takes tramadol at home and requested a palliative care consult. I discussed with them the bradycardia and do not want her in a monitored setting. They did state she does get delerius in the hospitals.     Given colitis noted on CT scan I will discuss with them if they want antibiotics on this admission.   She is to go to the floor and is pending XR of her hand.

## 2019-11-03 NOTE — H&P ADULT - ASSESSMENT
ASSESSMENT:  89F pmhx dementia, HTN, CAD, depression, level 1 trauma for hypotension following a fall from standing, found to have diffuse colitis with some GI bleeding, as well as hip fractures.    PLAN:  - Will admit to [facility] under Dr. Vegas  - Following discussion with the patient's daughter & HCP, they would like to pursue very minimal intervention and mostly comfort care (Specifically, the daughter would not want a colonoscopy, IR procedure, or surgery)  - MOLST for filled out and signed indicated DNR/DNI   - Will start home meds and IV fluids  - Will discuss the possibility of antibiotics with the daughter  - No plan to check labs at this time    Seen and examined with Dr. Ascencion Acuna, PGY-3  [facility] Surgery [phone]

## 2019-11-03 NOTE — CONSULT NOTE ADULT - SUBJECTIVE AND OBJECTIVE BOX
89yFemale presents to [facility] ED c/o severe R wrist pain s/p mechanical fall, also c/o severe abdominal pain. Patient denies head hit or LOC, family with patient at bedside.  Localizing pain to distal radius and abdomen.  Daughter states her they dont know how she fell, patient lives in assistive living facility alone, with help of an aide. Denies radiation of pain. Pt denies numbness, tingling or burning. L Hand Dominant. C/o severe abdominal pain.     PMH:  Cataract  History of Osteoporosis  HTN (Hypertension)  HLD  GERD (Gastroesophageal Reflux Disease)  AI (Aortic Incompetence)    PSH:  Cataract, repair  - right  - 7/10  History of Colonoscopy  History of Tonsillectomy  History of Aortic Valve Replacement 2001      Meds: See med rec    T(C): 36.7 (11-03-19 @ 15:45)  HR: 66 (11-03-19 @ 15:45)  BP: 144/81 (11-03-19 @ 15:45)  RR: 18 (11-03-19 @ 15:45)  SpO2: 98% (11-03-19 @ 15:45)    PE Right UE:  Skin intact, visible deformity of wrist, + soft tissue swelling, no ecchymosis; Decreased ROM of Wrist 2/2 pain. Full Passive Elbow/Shoulder ROM w/o pain. + TTP over DR/Ulna. + Rad Pulse 2+/4. SILT C5-T1, unable to participate in full hand exam but moving all fingers, able to flex and extend digits, digits warm and perfused, soft compartments;    Secondary Survey: No TTP over bony prominences of BLLE and LUE, full PROM w/o pain with BLLE and LUE, SILT, palpable pulses, full/painless range of motion, compartments soft, no ttp to axial spine, abdomen distended and TTP.       Imaging:  XRay R Wrist  3 views of R Wrist demonstrates R distal radius fracture, intra/extra-articular extension, dorsally displaced.       Procedure Note:  After verbal consent obtained, ~ 10cc of 1% Lidocaine injected into area around DR/Ulna as hematoma block. UE hung by fingers and reduction maneuver performed. Sugartong splint applied to Forearm/Wrist and mold held. Daughter requested no more xrays be taken and refused post reduction xrays. Daughter verbalized understanding of risks of malalignment or mal positioning.   Pt tolerated procedure well.    A/P: 89yFemale s/p Mech Fall w/ R Distal Radius Fracture  - Daughter refused post reduction or any further xrays, daughter to have goals of care conversations regarding care  - Will FU with primary team regarding plan  - reduced and splinted for comfort, d/w daughter before reduction  - Pain control  - Strict Ice/Elevation  - NWB RUE with splint and sling  - Keep splint clean/dry/intact;  - Encourage active finger motion to help with swelling  - No surgical intervention warranted at this time  - Daughter made aware of signs and symptoms of compartment syndrome.   - All Pt's / Family Members questions answered, Pt/family understand plan.  - Will d/w Dr Contreras

## 2019-11-03 NOTE — CONSULT NOTE ADULT - PROBLEM SELECTOR PROBLEM 4
Medical orders for life-sustaining treatment (MOLST) form in chart Gastrointestinal hemorrhage, unspecified gastrointestinal hemorrhage type

## 2019-11-03 NOTE — H&P ADULT - NSHPPHYSICALEXAM_GEN_ALL_CORE
Gen: AAOx3, NAD, mentating normally  Neuro: Cranial nerves II-XII grossly intact  HEENT: Atraumatic, normocephalic. No signs of trauma to the oropharynx. Trachea midline.  CV: RRR, normal S1/S2, no audible m/r/g  Pulm: Breathing comfortably on RA. Equal chest rise b/l. Lung fields CTAB  Thorax. Right-sided chest tenderness along the mid-axillary line  Abd: Soft, tender in the lower abdomen, mildly distended. No rebound or guarding.  Back/flank: No CVA tenderness. Midline spinal tenderness over the thoracic & lumbar spine. No stepoffs.  Extremities: WWP. Moving all 4 extremities spontaneously. Right wrist tenderness and visible deformity. Small abrasion over the right knee.  Vascular: Palpable DP & radial pulses b/l.   Skin: No rashes or suspicious lesions

## 2019-11-03 NOTE — CONSULT NOTE ADULT - SUBJECTIVE AND OBJECTIVE BOX
Chief Complaint:  Patient is a 89y old  Female who presents with a chief complaint of s/p fall, symptomatic anemia (03 Nov 2019 15:06)    HPI:SILVIA FLORES is a 89y Female w/ dementia, CAD, some unknown malignancy – per daughter pt has possible lung mass and recently told that her labs are c/w some kind of cancer, but deferring work-up given advanced age and dementia; pt herself is not aware of this – brought in as trauma after fall after getting up from the toilet after having a bowel movement. Pt was witnessed to have melena once she arrived in the ED.    Per daughter, pt has had weeks of very bad diarrhea. This has not been worked up but pt was prescribed imodium.     Pt was initially hypotensive, Hb was 8, improved w/ IVF. She also had transient bradycardia to 39. She is c/o severe abd pain. She sustained a wrist fracture. CT A/P shows diffuse colitis in descending and sigmoid colon, and short segment of focal wall thickening of mid T colon c/f mass.     Daughter HCP prefers no invasive procedures, including endoscopy, and wants to focus on comfort. This appears in line with her thinking over the last several months (e.g. not working up likely malignancy).       PMHX/PSHX:  Cataract  Thoracic Back Pain  Upper Back Pain  Rib Fracture left side  - from fall one months ago  History of Osteoporosis  HTN (Hypertension)  Hypertriglyceridemia  GERD (Gastroesophageal Reflux Disease)  AI (Aortic Incompetence)  Cataract, repair  - right  - 7/10  History of Colonoscopy  History of Tonsillectomy  History of Aortic Valve Replacement 2001    Allergies:  No Known Allergies      Home Medications: reviewed  Hospital Medications:  acetaminophen   Tablet .. 975 milliGRAM(s) Oral every 6 hours  amLODIPine   Tablet 2.5 milliGRAM(s) Oral daily  atorvastatin 40 milliGRAM(s) Oral at bedtime  buPROPion XL . 150 milliGRAM(s) Oral daily  famotidine    Tablet 20 milliGRAM(s) Oral daily  morphine  - Injectable 2 milliGRAM(s) IV Push every 4 hours PRN      Social History:   Tob: Denies  EtOH: Denies  Illicit Drugs: Denies    Family history:  No pertinent family history in first degree relatives    Denies family history of colon cancer/polyps, stomach cancer/polyps, pancreatic cancer/masses, liver cancer/disease, ovarian cancer and endometrial cancer.    ROS:  Unable to provide    PHYSICAL EXAM:   Vital Signs:  Vital Signs Last 24 Hrs  T(C): 36.3 (03 Nov 2019 16:25), Max: 37.1 (03 Nov 2019 13:30)  T(F): 97.4 (03 Nov 2019 16:25), Max: 98.7 (03 Nov 2019 13:30)  HR: 73 (03 Nov 2019 16:25) (39 - 73)  BP: 145/72 (03 Nov 2019 16:25) (144/81 - 172/78)  BP(mean): --  RR: 16 (03 Nov 2019 16:25) (16 - 18)  SpO2: 100% (03 Nov 2019 16:25) (98% - 100%)  Daily Height in cm: 165.1 (03 Nov 2019 11:14)    Daily     GENERAL: in pain  NEURO: alert, responds to questions  HEENT: anicteric sclera, no conjunctival pallor appreciated  CHEST: no respiratory distress, no accessory muscle use, clear to auscultation bilaterally  CARDIAC: regular rate, rhythm, no mrg appreciated  ABDOMEN: soft, +++ tender b/l lower quadrants, non-distended  EXTREMITIES: warm, well perfused, no edema  SKIN: no lesions noted    LABS: reviewed                        8.0    15.06 )-----------( 483      ( 03 Nov 2019 11:44 )             26.3     11-03    141  |  105  |  38<H>  ----------------------------<  211<H>  4.3   |  22  |  0.87    Ca    8.5      03 Nov 2019 11:44    TPro  6.3  /  Alb  3.6  /  TBili  0.5  /  DBili  x   /  AST  22  /  ALT  20  /  AlkPhos  97  11-03    LIVER FUNCTIONS - ( 03 Nov 2019 11:44 )  Alb: 3.6 g/dL / Pro: 6.3 g/dL / ALK PHOS: 97 U/L / ALT: 20 U/L / AST: 22 U/L / GGT: x               Diagnostic Studies: see sunrise for full report

## 2019-11-03 NOTE — GOALS OF CARE CONVERSATION - ADVANCED CARE PLANNING - NS PRO AD PATIENT TYPE
Do Not Resuscitate (DNR)/Medical Orders for Life-Sustaining Treatment (MOLST) Medical Orders for Life-Sustaining Treatment (MOLST)/Living Will/Do Not Resuscitate (DNR)

## 2019-11-03 NOTE — PATIENT PROFILE ADULT - FALL HARM RISK
age(85 years old or older)/other/bones(Osteoporosis,prev fx,steroid use,metastatic bone ca)/coagulation(Bleeding disorder R/T clinical cond/anti-coags)

## 2019-11-03 NOTE — ED PROVIDER NOTE - CLINICAL SUMMARY MEDICAL DECISION MAKING FREE TEXT BOX
89F presents after fall with dark bloody stools, unknown down time.  Concern for multi-focal trauma, GI Bleed.  Trauma team called to bedside.  Will obtain pan-CT scan, XR of wrist for deformity, give empiric unit of blood emergently.  Discuss with family goals of care given daughter's reluctance towards aggressive measures. 89F presents after fall with dark bloody stools, unknown down time.  Concern for multi-focal trauma, GI Bleed.  Trauma team called to bedside.  Will obtain pan-CT scan, XR of wrist for deformity, give empiric unit of blood emergently.  Discuss with family goals of care given daughter's reluctance towards aggressive measures.  vishnu pt brought in by ems sp found in bathroom fllor aox2 unk baseline - pt with obv wrist defotmity - ems endorsew melena -- initial bp nml, followed by low bp - pt collarded 2 larg bore iv place fluid started call for level 1 trauma - emergent release blood - fast ? abd in pelvis - will need icu

## 2019-11-03 NOTE — CONSULT NOTE ADULT - PROBLEM SELECTOR RECOMMENDATION 3
FOBT+, melena, cbc revealed anemia (baseline unknown). S/p transfusion of 2 U pRBC. Management as per primary team. FOBT+, melena, cbc revealed anemia (baseline 11). S/p transfusion of 2 U pRBC. Management as per primary team. Patient has h/o anemia (previous Hgb ~11 as per daughter) for which she takes ferrous sulfate BID. Upon arrival patient was found to be hypotensive and on physical exam FOBT+. S/p transfusion of 2 U pRBC. Management as per primary team.

## 2019-11-03 NOTE — ED ADULT NURSE NOTE - NSIMPLEMENTINTERV_GEN_ALL_ED
Implemented All Fall with Harm Risk Interventions:  [place] to call system. Call bell, personal items and telephone within reach. Instruct patient to call for assistance. Room bathroom lighting operational. Non-slip footwear when patient is off stretcher. Physically safe environment: no spills, clutter or unnecessary equipment. Stretcher in lowest position, wheels locked, appropriate side rails in place. Provide visual cue, wrist band, yellow gown, etc. Monitor gait and stability. Monitor for mental status changes and reorient to person, place, and time. Review medications for side effects contributing to fall risk. Reinforce activity limits and safety measures with patient and family. Provide visual clues: red socks.

## 2019-11-03 NOTE — ED PROVIDER NOTE - OBJECTIVE STATEMENT
89F unknown medical history aside from dementia and possible valve repair for aortic insufficiency, presents with bloody stools, syncope, and fall in bathroom.  She has unknown down time, brought in by EMS as syncope, level 1 trauma called, trauma at bedside.  Initial , subsequent measurements in 80s-90s then manual .  She has no awareness of the incident, does not remember being on floor of bathroom.  She has significant pain in her groin and stomach, right wrist, also complaining of pain in back.  She is nauseous and vomiting.  Unable to endorse further ROS.  Unknown if patient on blood thinners.    Daughter provides additional history of multiple admissions for falls and fracture repairs.  She would like to make patient DNR and wants no aggressive surgical interventions.

## 2019-11-03 NOTE — CONSULT NOTE ADULT - PROBLEM SELECTOR RECOMMENDATION 2
Patient has h/o anemia for which she takes ferrous sulfate BID as per daughter. Upon arrival patient was found to be hypotensive and on physical exam FOBT+. S/p transfusion of 2 U pRBC. Management as per primary team. Patient has h/o anemia (previous Hgb ~11 as per daughter) for which she takes ferrous sulfate BID. Upon arrival patient was found to be hypotensive and on physical exam FOBT+. S/p transfusion of 2 U pRBC. Management as per primary team. Severe pain most likely 2/2 wrist fracture and hip discomfort s/p fall. Official imaging reports pending. Patient cannot recall events leading up to fall.     -Consider Tylenol 1 gm IV q8hr  -Consider Morphine 1 mg IV q4hr prn pain  -Ensure bowel regimen to prevent opioid induced constipation  -Will adjust dosing based on 24 hour requirement and symptom burden

## 2019-11-03 NOTE — H&P ADULT - NSHPLABSRESULTS_GEN_ALL_CORE
< end of copied text >    < from: CT Head No Cont (11.03.19 @ 12:19) >    IMPRESSION:     HEAD: No CT evidence of acute intracranial hemorrhage, brain edema, or   mass effect.Age-appropriate involutional changes.    CERVICAL SPINE: No evidence for acute displaced fracture or traumatic   malalignment. Cervical degenerative spondylosis, as described above.    < end of copied text >

## 2019-11-03 NOTE — ED PROVIDER NOTE - PROGRESS NOTE DETAILS
emergency blood released, to be admitted to Dr. Vegas initial bp nml, repeat bp 80 and 90 systolic fast with equiv finding in pelvis - in setting of low bp , and trauma with ems reported melena will give emergent blood and call level 1 trauma bp inc to  130 - 2 dashawn bore 18 g iv in place -- daughter [id number] wants pt DNR pt brought back from ct for bradycardia , bp improved after bloods-- ct being reviewed repeat ekg, looks like junctional lita wide complex - further dw family no interventiion Patient with family at bedside.  Interactive appropriately.  C-collar cleared after CT results.  No pain on neck manipulation, full ROM. Palliative at bedside, suggesting pain medication, morphine suggested.  Ordered due to patient's pain. xr reviewd ramus fx on rt, possible prox fib fx , distal rad ulna fx -- to be placed in knee immob and volar splint -ortho paged

## 2019-11-03 NOTE — GOALS OF CARE CONVERSATION - ADVANCED CARE PLANNING - WHAT MATTERS MOST
Patient wishes to participate in medical decision making process. Family is focused on patient's comfort.

## 2019-11-03 NOTE — ED ADULT TRIAGE NOTE - ESI TRIAGE ACUITY LEVEL, MLM
PATIENT DISCHARGED HOME VIA WHEELCHAIR WITH FAMILY. EDUCATION AND DISCHARGE COMPLETE.   VITAL SIGNS STABLE AT TIME OF DISCHARGE 2

## 2019-11-03 NOTE — CONSULT NOTE ADULT - PROBLEM SELECTOR RECOMMENDATION 6
Palliative care consulted for symptom management (mainly pain), GOC and complex decision-making process. Palliative care consulted for symptom management, particularly pain, GOC and complex decision-making process.    Argentina Saez MD  Palliative Medicine Fellow On-Call    In the event of newly developing, evolving, or worsening symptoms, please contact the Palliative Medicine team via pager (if the patient is at [facility] #[phone] or if the patient is at [facility] #[phone]) The Geriatric and Palliative Medicine service has coverage 24 hours a day/ 7 days a week to provide medical recommendations regarding symptom management needs MOLST in chart (incomplete). HCP and Living Will documentation copies placed in patient's chart.    As per discussion with patient, she wishes to participate in decision-making process, would like that all information be shared with her and not solely her daughter (Virgen Craig) although she is her HCP. Patient was consistent in her wishes when asked multiple times by palliative medicine attending.     Of note: Patient's daughter was hesitant about sharing medical information with her mother in part due to historical context of the patient not sharing her own mother's cancer diagnosis with her mother. Patient's daughter revealed that during past medical visits with PCP (Dr. PEGGY Sotelo), that she made the decision to not pursue medical investigation of possible lung mass and liver mass without informing her mother. Patient's daughter revealed that she would like to readdress MOLST form options such as IV fluids. Patient is DNR/DNI at present time. Patient's daughter expressed wishes for comfort care approach, no extraordinary measures or aggressive/invasive measures are to be taken. MOLST in chart (incomplete). HCP and Living Will documentation copies placed in patient's chart.    As per discussion with patient, she wishes to participate in decision-making process, would like that all information be shared with her and not solely her daughter (Virgen Craig) although she is her HCP. Patient was consistent in her wishes when asked multiple times by palliative medicine attending.     Of note: Patient's daughter was hesitant about sharing medical information with her mother in part due to historical context of the patient not sharing her own mother's cancer diagnosis with her mother. Patient's daughter revealed that during past medical visits  that she made the decision to not pursue medical investigation of possible lung mass and liver mass without informing her mother. Patient's daughter revealed that she would like to readdress MOLST form options such as IV fluids. Patient is DNR/DNI at present time. Patient's daughter expressed wishes for comfort care approach, no extraordinary measures or aggressive/invasive measures are to be taken.

## 2019-11-03 NOTE — H&P ADULT - NSICDXPASTMEDICALHX_GEN_ALL_CORE_FT
PAST MEDICAL HISTORY:  AI (Aortic Incompetence)     Cataract     GERD (Gastroesophageal Reflux Disease)     History of Osteoporosis     HTN (Hypertension)     Hypertriglyceridemia     Rib Fracture left side  - from fall one months ago     Thoracic Back Pain     Upper Back Pain

## 2019-11-03 NOTE — ED PROVIDER NOTE - CARE PLAN
Principal Discharge DX:	GIB (gastrointestinal bleeding) Principal Discharge DX:	Fall, initial encounter  Secondary Diagnosis:	Wrist injury, right, initial encounter  Secondary Diagnosis:	Gastrointestinal hemorrhage, unspecified gastrointestinal hemorrhage type  Secondary Diagnosis:	Acute pain of right hip  Secondary Diagnosis:	Lower abdominal pain

## 2019-11-03 NOTE — ED ADULT NURSE NOTE - CHIEF COMPLAINT
no discrete location documentation necessary The patient is a 89y Female complaining of bloody stools.

## 2019-11-03 NOTE — CONSULT NOTE ADULT - PROBLEM SELECTOR PROBLEM 5
Advanced care planning/counseling discussion Medical orders for life-sustaining treatment (MOLST) form in chart

## 2019-11-03 NOTE — CONSULT NOTE ADULT - PROBLEM SELECTOR RECOMMENDATION 4
MOLST in chart, however incomplete/invalid.     -Please update MOLST documentation. FOBT+, melena, cbc revealed anemia (baseline 11). S/p transfusion of 2 U pRBC. Management as per primary team.

## 2019-11-03 NOTE — CONSULT NOTE ADULT - PROBLEM SELECTOR RECOMMENDATION 5
MOLST in chart (incomplete). HCP and Living Will documentation copies placed in patient's chart.    As per discussion with patient, she wishes to participate in decision-making process, would like that all information be shared with her and not solely her daughter (Virgen Craig) although she is her HCP. Patient was consistent in her wishes when asked multiple times by palliative medicine attending.     Of note: Patient's daughter was hesitant about sharing medical information with her mother in part due to historical context of the patient not sharing her own mother's cancer diagnosis with her mother. Patient's daughter revealed that during past medical visits with PCP (Dr. PEGGY Sotelo), that she made the decision to not pursue medical investigation of possible lung mass and liver mass without informing her mother. Patient's daughter revealed that she would like to readdress MOLST form options such as IV fluids. Patient is DNR/DNI at present time. MOLST in chart (incomplete). HCP and Living Will documentation copies placed in patient's chart.    As per discussion with patient, she wishes to participate in decision-making process, would like that all information be shared with her and not solely her daughter (Virgen Craig) although she is her HCP. Patient was consistent in her wishes when asked multiple times by palliative medicine attending.     Of note: Patient's daughter was hesitant about sharing medical information with her mother in part due to historical context of the patient not sharing her own mother's cancer diagnosis with her mother. Patient's daughter revealed that during past medical visits with PCP (Dr. PEGGY Sotelo), that she made the decision to not pursue medical investigation of possible lung mass and liver mass without informing her mother. Patient's daughter revealed that she would like to readdress MOLST form options such as IV fluids. Patient is DNR/DNI at present time. Patient's daughter expressed wishes for comfort care approach, no extraordinary measures or aggressive/invasive measures are to be taken. MOLST in chart, however incomplete/invalid.     -Please update MOLST documentation.

## 2019-11-04 ENCOUNTER — CHART COPY (OUTPATIENT)
Age: 84
End: 2019-11-04

## 2019-11-04 ENCOUNTER — TRANSCRIPTION ENCOUNTER (OUTPATIENT)
Age: 84
End: 2019-11-04

## 2019-11-04 VITALS
DIASTOLIC BLOOD PRESSURE: 85 MMHG | TEMPERATURE: 98 F | OXYGEN SATURATION: 99 % | HEART RATE: 88 BPM | SYSTOLIC BLOOD PRESSURE: 139 MMHG | RESPIRATION RATE: 18 BRPM
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
## 2019-11-04 DIAGNOSIS — S52.90XA UNSPECIFIED FRACTURE OF UNSPECIFIED FOREARM, INITIAL ENCOUNTER FOR CLOSED FRACTURE: ICD-10-CM

## 2019-11-04 DIAGNOSIS — S22.39XA FRACTURE OF ONE RIB, UNSPECIFIED SIDE, INITIAL ENCOUNTER FOR CLOSED FRACTURE: ICD-10-CM

## 2019-11-04 DIAGNOSIS — G93.40 ENCEPHALOPATHY, UNSPECIFIED: ICD-10-CM

## 2019-11-04 DIAGNOSIS — R53.2 FUNCTIONAL QUADRIPLEGIA: ICD-10-CM

## 2019-11-04 DIAGNOSIS — K52.9 NONINFECTIVE GASTROENTERITIS AND COLITIS, UNSPECIFIED: ICD-10-CM

## 2019-11-04 DIAGNOSIS — S32.599A OTHER SPECIFIED FRACTURE OF UNSPECIFIED PUBIS, INITIAL ENCOUNTER FOR CLOSED FRACTURE: ICD-10-CM

## 2019-11-04 DIAGNOSIS — Z51.5 ENCOUNTER FOR PALLIATIVE CARE: ICD-10-CM

## 2019-11-04 DIAGNOSIS — I10 ESSENTIAL (PRIMARY) HYPERTENSION: ICD-10-CM

## 2019-11-04 PROCEDURE — 72170 X-RAY EXAM OF PELVIS: CPT

## 2019-11-04 PROCEDURE — 71260 CT THORAX DX C+: CPT

## 2019-11-04 PROCEDURE — 84484 ASSAY OF TROPONIN QUANT: CPT

## 2019-11-04 PROCEDURE — 99497 ADVNCD CARE PLAN 30 MIN: CPT | Mod: 25

## 2019-11-04 PROCEDURE — 99223 1ST HOSP IP/OBS HIGH 75: CPT

## 2019-11-04 PROCEDURE — 83690 ASSAY OF LIPASE: CPT

## 2019-11-04 PROCEDURE — 84132 ASSAY OF SERUM POTASSIUM: CPT

## 2019-11-04 PROCEDURE — G0390: CPT

## 2019-11-04 PROCEDURE — 85027 COMPLETE CBC AUTOMATED: CPT

## 2019-11-04 PROCEDURE — 70450 CT HEAD/BRAIN W/O DYE: CPT

## 2019-11-04 PROCEDURE — 73110 X-RAY EXAM OF WRIST: CPT

## 2019-11-04 PROCEDURE — 96374 THER/PROPH/DIAG INJ IV PUSH: CPT

## 2019-11-04 PROCEDURE — 86900 BLOOD TYPING SEROLOGIC ABO: CPT

## 2019-11-04 PROCEDURE — 82330 ASSAY OF CALCIUM: CPT

## 2019-11-04 PROCEDURE — 86901 BLOOD TYPING SEROLOGIC RH(D): CPT

## 2019-11-04 PROCEDURE — 71045 X-RAY EXAM CHEST 1 VIEW: CPT

## 2019-11-04 PROCEDURE — 99222 1ST HOSP IP/OBS MODERATE 55: CPT | Mod: GC

## 2019-11-04 PROCEDURE — 72125 CT NECK SPINE W/O DYE: CPT

## 2019-11-04 PROCEDURE — 82435 ASSAY OF BLOOD CHLORIDE: CPT

## 2019-11-04 PROCEDURE — 85610 PROTHROMBIN TIME: CPT

## 2019-11-04 PROCEDURE — P9016: CPT

## 2019-11-04 PROCEDURE — 74177 CT ABD & PELVIS W/CONTRAST: CPT

## 2019-11-04 PROCEDURE — 82803 BLOOD GASES ANY COMBINATION: CPT

## 2019-11-04 PROCEDURE — 86850 RBC ANTIBODY SCREEN: CPT

## 2019-11-04 PROCEDURE — 83605 ASSAY OF LACTIC ACID: CPT

## 2019-11-04 PROCEDURE — 99291 CRITICAL CARE FIRST HOUR: CPT | Mod: 25

## 2019-11-04 PROCEDURE — 82272 OCCULT BLD FECES 1-3 TESTS: CPT

## 2019-11-04 PROCEDURE — 84295 ASSAY OF SERUM SODIUM: CPT

## 2019-11-04 PROCEDURE — 85014 HEMATOCRIT: CPT

## 2019-11-04 PROCEDURE — 85730 THROMBOPLASTIN TIME PARTIAL: CPT

## 2019-11-04 PROCEDURE — 76705 ECHO EXAM OF ABDOMEN: CPT

## 2019-11-04 PROCEDURE — 73562 X-RAY EXAM OF KNEE 3: CPT

## 2019-11-04 PROCEDURE — 73090 X-RAY EXAM OF FOREARM: CPT

## 2019-11-04 PROCEDURE — 80053 COMPREHEN METABOLIC PANEL: CPT

## 2019-11-04 PROCEDURE — 73502 X-RAY EXAM HIP UNI 2-3 VIEWS: CPT

## 2019-11-04 PROCEDURE — 82947 ASSAY GLUCOSE BLOOD QUANT: CPT

## 2019-11-04 PROCEDURE — 36430 TRANSFUSION BLD/BLD COMPNT: CPT

## 2019-11-04 RX ORDER — FENTANYL CITRATE 50 UG/ML
1 INJECTION INTRAVENOUS
Qty: 0 | Refills: 0 | DISCHARGE
Start: 2019-11-04

## 2019-11-04 RX ORDER — MORPHINE SULFATE 50 MG/1
1 CAPSULE, EXTENDED RELEASE ORAL EVERY 4 HOURS
Refills: 0 | Status: DISCONTINUED | OUTPATIENT
Start: 2019-11-04 | End: 2019-11-04

## 2019-11-04 RX ORDER — FAMOTIDINE 10 MG/ML
1 INJECTION INTRAVENOUS
Qty: 0 | Refills: 0 | DISCHARGE

## 2019-11-04 RX ORDER — ATORVASTATIN CALCIUM 80 MG/1
1 TABLET, FILM COATED ORAL
Qty: 0 | Refills: 0 | DISCHARGE
Start: 2019-11-04

## 2019-11-04 RX ORDER — AMLODIPINE BESYLATE 2.5 MG/1
1 TABLET ORAL
Qty: 0 | Refills: 0 | DISCHARGE
Start: 2019-11-04

## 2019-11-04 RX ORDER — FAMOTIDINE 10 MG/ML
1 INJECTION INTRAVENOUS
Qty: 0 | Refills: 0 | DISCHARGE
Start: 2019-11-04

## 2019-11-04 RX ORDER — FENTANYL CITRATE 50 UG/ML
1 INJECTION INTRAVENOUS
Refills: 0 | Status: DISCONTINUED | OUTPATIENT
Start: 2019-11-04 | End: 2019-11-04

## 2019-11-04 RX ORDER — MORPHINE SULFATE 50 MG/1
1 CAPSULE, EXTENDED RELEASE ORAL
Qty: 0 | Refills: 0 | DISCHARGE
Start: 2019-11-04

## 2019-11-04 RX ORDER — ACETAMINOPHEN 500 MG
19.06 TABLET ORAL
Qty: 0 | Refills: 0 | DISCHARGE
Start: 2019-11-04

## 2019-11-04 RX ORDER — ACETAMINOPHEN 500 MG
610 TABLET ORAL EVERY 6 HOURS
Refills: 0 | Status: DISCONTINUED | OUTPATIENT
Start: 2019-11-04 | End: 2019-11-04

## 2019-11-04 RX ORDER — BUPROPION HYDROCHLORIDE 150 MG/1
1 TABLET, EXTENDED RELEASE ORAL
Qty: 0 | Refills: 0 | DISCHARGE

## 2019-11-04 RX ORDER — MORPHINE SULFATE 50 MG/1
2 CAPSULE, EXTENDED RELEASE ORAL ONCE
Refills: 0 | Status: DISCONTINUED | OUTPATIENT
Start: 2019-11-04 | End: 2019-11-04

## 2019-11-04 RX ORDER — AMLODIPINE BESYLATE 2.5 MG/1
1 TABLET ORAL
Qty: 0 | Refills: 0 | DISCHARGE

## 2019-11-04 RX ORDER — BUPROPION HYDROCHLORIDE 150 MG/1
1 TABLET, EXTENDED RELEASE ORAL
Qty: 0 | Refills: 0 | DISCHARGE
Start: 2019-11-04

## 2019-11-04 RX ORDER — ATORVASTATIN CALCIUM 80 MG/1
1 TABLET, FILM COATED ORAL
Qty: 0 | Refills: 0 | DISCHARGE

## 2019-11-04 RX ADMIN — MORPHINE SULFATE 2 MILLIGRAM(S): 50 CAPSULE, EXTENDED RELEASE ORAL at 02:12

## 2019-11-04 RX ADMIN — AMLODIPINE BESYLATE 2.5 MILLIGRAM(S): 2.5 TABLET ORAL at 05:19

## 2019-11-04 RX ADMIN — MORPHINE SULFATE 2 MILLIGRAM(S): 50 CAPSULE, EXTENDED RELEASE ORAL at 06:39

## 2019-11-04 RX ADMIN — MORPHINE SULFATE 1 MILLIGRAM(S): 50 CAPSULE, EXTENDED RELEASE ORAL at 12:16

## 2019-11-04 RX ADMIN — Medication 975 MILLIGRAM(S): at 05:49

## 2019-11-04 RX ADMIN — MORPHINE SULFATE 2 MILLIGRAM(S): 50 CAPSULE, EXTENDED RELEASE ORAL at 07:09

## 2019-11-04 RX ADMIN — MORPHINE SULFATE 2 MILLIGRAM(S): 50 CAPSULE, EXTENDED RELEASE ORAL at 09:32

## 2019-11-04 RX ADMIN — MORPHINE SULFATE 2 MILLIGRAM(S): 50 CAPSULE, EXTENDED RELEASE ORAL at 10:32

## 2019-11-04 RX ADMIN — Medication 975 MILLIGRAM(S): at 05:19

## 2019-11-04 RX ADMIN — FENTANYL CITRATE 1 PATCH: 50 INJECTION INTRAVENOUS at 12:59

## 2019-11-04 RX ADMIN — MORPHINE SULFATE 2 MILLIGRAM(S): 50 CAPSULE, EXTENDED RELEASE ORAL at 01:42

## 2019-11-04 NOTE — DISCHARGE NOTE PROVIDER - NSDCFUADDINST_GEN_ALL_CORE_FT
Will continue care at long term facility and hospice Will continue care at long term facility and with hospice

## 2019-11-04 NOTE — CONSULT NOTE ADULT - PROBLEM SELECTOR RECOMMENDATION 9
Pt found to have pubic rami fx on imaging s/p fall   Pain control per sx  No surgical intervention   Family requesting pallative/comfort care

## 2019-11-04 NOTE — GOALS OF CARE CONVERSATION - ADVANCED CARE PLANNING - CONVERSATION DETAILS
Patient evaluated for hospice services. Inpatient appropriate. family wants patient to stay in this hospital setting. Approval obtained and bed available for her on PCU. Transfer set in motion but patient  prior to transfer.

## 2019-11-04 NOTE — GOALS OF CARE CONVERSATION - ADVANCED CARE PLANNING - NS PRO AD PATIENT TYPE ON CHART
Medical Orders for Life-Sustaining Treatment (MOLST)/Living Will/MOLST incomplete/Do Not Resuscitate (DNR)/Health Care Proxy (HCP)
Living Will/Health Care Proxy (HCP)/Do Not Resuscitate (DNR)/Medical Orders for Life-Sustaining Treatment (MOLST)/MOLST incomplete

## 2019-11-04 NOTE — DISCHARGE NOTE PROVIDER - CARE PROVIDER_API CALL
Gwendolyn Vegas (MD)  Surgery; Surgical Critical Care  [address] [facility], [address] [address]  Phone: [phone]  Fax: [phone]  Follow Up Time:

## 2019-11-04 NOTE — CONSULT NOTE ADULT - PROBLEM SELECTOR RECOMMENDATION 6
Extensive discussion with daughter and granddaughter at bedside   opting for full comfort measures   pallative care follow up pending

## 2019-11-04 NOTE — DISCHARGE NOTE FOR THE EXPIRED PATIENT - HOSPITAL COURSE
89F pmhx dementia, HTN, CAD, depression, brought in after a fall from standing, upgraded to level 1 trauma activation for hypotension. Per the patient and her daughter, the patient was found down on the floor after a fall. She denied head trauma and is unclear if she lost consciousness. She was found to have a positive FOBT and was transfused 2 PRBCs. She had abdominal pain, left hip pain, left wrist pain. She was found to have diffuse colitis on CT scan. X-rays revealed wrist fracture and pubic rami fracture. After discussion with her family, they decided that she should have comfort measures only with no intervention. She was given limited monitoring on the hospital floor, tylenol and morphine for pain control and palliative care was called. The patient passed the following day at 14:21.

## 2019-11-04 NOTE — DISCHARGE NOTE PROVIDER - HOSPITAL COURSE
89 y/o female w/ pmhx of dementia, HTN, CAD, depression, brought in to the emergency room after a fall from standing 10/03/19, upgraded to level 1 trauma activation for hypotension. Per the patient and her daughter, the patient was found down on the floor after she fell. She denied any head trauma and is unclear if she lost consciousness. She did not clearly recall the events surrounding her fall, but does believe she was having a bowel movement immediately prior. Her main complaints were right-sided abdominal/hip pain and right wrist pain. On arrival to the ED, primary survey intact. GCS 15. Secondary notable for tenderness of the right chest wall, right wrist, lower abdomen, and right hip.       Patient was initially hypotensive, Hb was 8, improved w/ IVF and one unit of PRBC. She also had transient bradycardia to 39. Pt underwent x-rays of her R wrist, R forearm, R knee and pelvis. On x-rays they found: Pelvic: fracture, right superior pubic ramus with buckle fracture of the right inferior pubic ramus. Right wrist: acute, comminuted fracture of the distal radius with volar angulation. Overlying soft tissue swelling is present. First MCP joint space is narrowed, consistent with osteoarthritis. Right knee: Questionable proximal fibular fracture. Clinical correlation is suggested. Right forearm: .. Orthopedics was consulted and reduced and splinted R distal radius fracture. No surgical intervention needed from an orthopedics stand point. Ortho recommends PT/OT, NWB  RUE in splint abd sling for comfort. Patient had a CT A/P due to severe abd pain. CT A/P shows diffuse colitis in descending and sigmoid colon, and short segment of focal wall thickening of mid T colon c/f mass. Patient has a DNI/DNR, her daughter is the HCP and prefers no invasive procedures, including endoscopy, and wants to focus on comfort. Patient will be going back to her long term facility ([facility]) and will receive hospice care upon discharge. 89 y/o female w/ pmhx of dementia, HTN, CAD, depression, brought in to the emergency room after a fall from standing 10/03/19, upgraded to level 1 trauma activation for hypotension. Per the patient and her daughter, the patient was found down on the floor after she fell. She denied any head trauma and is unclear if she lost consciousness. She did not clearly recall the events surrounding her fall, but does believe she was having a bowel movement immediately prior. Her main complaints were right-sided abdominal/hip pain and right wrist pain. On arrival to the ED, primary survey intact. GCS 15. Secondary notable for tenderness of the right chest wall, right wrist, lower abdomen, and right hip.       Patient was initially hypotensive, Hb was 8, improved w/ IVF and one unit of PRBC. She also had transient bradycardia to 39. Pt underwent x-rays of her R wrist, R forearm, R knee and pelvis. On x-rays they found: Pelvic: fracture, right superior pubic ramus with buckle fracture of the right inferior pubic ramus. Right wrist: acute, comminuted fracture of the distal radius with volar angulation. Overlying soft tissue swelling is present. First MCP joint space is narrowed, consistent with osteoarthritis. Right knee: Questionable proximal fibular fracture. Clinical correlation is suggested. Right forearm: .. Orthopedics was consulted and reduced and splinted R distal radius fracture. No surgical intervention needed from an orthopedics stand point. Ortho recommends PT/OT, NWB  RUE in splint abd sling for comfort. Patient had a CT A/P due to severe abd pain. CT A/P shows diffuse colitis in descending and sigmoid colon, and short segment of focal wall thickening of mid T colon c/f mass. Trauma surgery team recommended endoscopy. Patient has a DNI/DNR, her daughter is the HCP and prefers no invasive procedures, including endoscopy, and wants to focus on comfort. Patient will be going back to her long term facility ([facility]) and will receive hospice care upon discharge. 89 y/o female w/ PMHx of dementia, HTN, CAD, depression, brought in to the emergency room after a fall from standing 10/03/19, upgraded to level 1 trauma activation for hypotension. Per the patient and her daughter, the patient was found down on the floor after she fell. She denied any head trauma and is unclear if she lost consciousness. She did not clearly recall the events surrounding her fall, but does believe she was having a bowel movement immediately prior. Her main complaints were right-sided abdominal/hip pain and right wrist pain. On arrival to the ED, primary survey intact. GCS 15. Secondary notable for tenderness of the right chest wall, right wrist, lower abdomen, and right hip.           Patient was initially hypotensive, Hb was 8, improved w/ IVF and one unit of PRBC. She also had transient bradycardia to 39. Pt underwent x-rays of her R wrist, R forearm, R knee and pelvis. On x-rays they found: Pelvic: fracture, right superior pubic ramus with buckle fracture of the right inferior pubic ramus. Right wrist: acute, comminuted fracture of the distal radius with volar angulation. Overlying soft tissue swelling is present. First MCP joint space is narrowed, consistent with osteoarthritis. Right knee: Questionable proximal fibular fracture. Clinical correlation is suggested. Right forearm: X ray showed diffuse osseous demineralization limits evaluation, Impacted distal radius fracture, evaluated in greater detail on dedicated right wrist radiographs performed the same day. Apparent irregularity at     the distal ulna, question fracture of uncertain chronicity, evaluation limited. If clinically indicated, correlation with cross-sectional imaging/CT can be performed. The distal ulna projects distal to the the level of the distal articular surface of the distal radius at the level of the distal radioulnar joint; correlate clinically to exclude subluxation/dislocation at the distal radioulnar joint. Soft tissue swelling over the wrist. No displaced proximal forearm fracture. No dislocation is seen at the elbow joint. Severe arthritic changes of the thumb basilar joint.    Orthopedics was consulted and reduced and splinted R distal radius fracture. No surgical intervention needed from an orthopedics stand point. Ortho recommends PT/OT, NWB  RUE in splint abd sling for comfort. Patient had a CT A/P due to severe abd pain. CT A/P shows diffuse colitis in descending and sigmoid colon, and short segment of focal wall thickening of mid T colon c/f mass. Trauma surgery team recommended endoscopy. Patient has a DNI/DNR, her daughter is the HCP and prefers no invasive procedures, including endoscopy, and wants to focus on comfort. Patient will be going back to her long term facility ([facility]) and will receive hospice care upon discharge. 89 y/o female w/ PMHx of dementia, HTN, CAD, depression, brought in to the emergency room after a fall from standing 10/03/19, upgraded to level 1 trauma activation for hypotension. Per the patient and her daughter, the patient was found down on the floor after she fell. She denied any head trauma and is unclear if she lost consciousness. She did not clearly recall the events surrounding her fall, but does believe she was having a bowel movement immediately prior. Her main complaints were right-sided abdominal/hip pain and right wrist pain. On arrival to the ED, primary survey intact. GCS 15. Secondary notable for tenderness of the right chest wall, right wrist, lower abdomen, and right hip.           Patient was initially hypotensive, Hb was 8, improved w/ IVF and one unit of PRBC. She also had transient bradycardia to 39. Pt underwent x-rays of her R wrist, R forearm, R knee and pelvis. On x-rays they found: Pelvic: fracture, right superior pubic ramus with buckle fracture of the right inferior pubic ramus. Right wrist: acute, comminuted fracture of the distal radius with volar angulation. Overlying soft tissue swelling is present. First MCP joint space is narrowed, consistent with osteoarthritis. Right knee: Questionable proximal fibular fracture. Clinical correlation is suggested. Right forearm: X ray showed diffuse osseous demineralization limits evaluation, Impacted distal radius fracture, evaluated in greater detail on dedicated right wrist radiographs performed the same day. Apparent irregularity at     the distal ulna, question fracture of uncertain chronicity, evaluation limited. If clinically indicated, correlation with cross-sectional imaging/CT can be performed. The distal ulna projects distal to the the level of the distal articular surface of the distal radius at the level of the distal radioulnar joint; correlate clinically to exclude subluxation/dislocation at the distal radioulnar joint. Soft tissue swelling over the wrist. No displaced proximal forearm fracture. No dislocation is seen at the elbow joint. Severe arthritic changes of the thumb basilar joint. Orthopedics was consulted and reduced and splinted R distal radius fracture. No surgical intervention needed from an orthopedics stand point. Ortho recommends PT/OT, NWB  RUE in splint abd sling for comfort. Patient had a CT A/P due to severe abd pain. CT A/P shows diffuse colitis in descending and sigmoid colon, and short segment of focal wall thickening of mid T colon c/f mass. Gastroenterology consulted, and recommended along with Trauma surgery team, an  endoscopy. Patient has a DNI/DNR, her daughter is the HCP and prefers no invasive procedures, including endoscopy, and wants to focus on comfort. Patient will be going to Palliative Unit.

## 2019-11-04 NOTE — CONSULT NOTE ADULT - PROBLEM SELECTOR RECOMMENDATION 9
Decrease dose of Morphine from 2 mg to 1 mg IVP q 4 hours.  Lethargic and minimally arousable, but comfortable appearing

## 2019-11-04 NOTE — DISCHARGE NOTE PROVIDER - NSDCMRMEDTOKEN_GEN_ALL_CORE_FT
amLODIPine 2.5 mg oral tablet: 1 tab(s) orally once a day  atorvastatin 40 mg oral tablet: 1 tab(s) orally once a day  buPROPion 150 mg/12 hours (SR) oral tablet, extended release: 1 tab(s) orally 2 times a day  famotidine 20 mg oral tablet: 1 tab(s) orally 2 times a day  Plavix 75 mg oral tablet: 1 tab(s) orally once a day acetaminophen 160 mg/5 mL oral suspension: 19.06 milliliter(s) orally every 6 hours  amLODIPine 2.5 mg oral tablet: 1 tab(s) orally once a day  atorvastatin 40 mg oral tablet: 1 tab(s) orally once a day (at bedtime)  buPROPion 150 mg/24 hours (XL) oral tablet, extended release: 1 tab(s) orally once a day  famotidine 20 mg oral tablet: 1 tab(s) orally once a day  fentaNYL 12 mcg/hr transdermal film, extended release: 1 patch transdermal every 72 hours  morphine: 1 milligram(s) injectable every 4 hours

## 2019-11-04 NOTE — CONSULT NOTE ADULT - SUBJECTIVE AND OBJECTIVE BOX
HPI:  89F pmhx dementia, HTN, CAD, depression, brought in after a fall from standing, upgraded to level 1 trauma activation for hypotension. Per the patient and her daughter, the patient was found down on the floor after a fall today. She was found to have diffuse colitis on CT scan. X-rays revealed wrist fracture and pubic rami fracture. Pt evaluated at 9:30 am, daughter and granddaughter at bedside, requesting pallative care, comfort measures. Pt currently lethargic, arousable to painful stimuli    PAST MEDICAL & SURGICAL HISTORY:  Cataract  Thoracic Back Pain  Upper Back Pain  Rib Fracture left side  - from fall one months ago  History of Osteoporosis  HTN (Hypertension)  Hypertriglyceridemia  GERD (Gastroesophageal Reflux Disease)  AI (Aortic Incompetence)  Cataract, repair  - right  - 7/10  History of Colonoscopy  History of Tonsillectomy  History of Aortic Valve Replacement 2001      Review of Systems:   Unable to obtain due to lethargy     Allergies    No Known Allergies    Intolerances            FAMILY HISTORY:  No pertinent family history in first degree relatives      MEDICATIONS  (STANDING):  acetaminophen    Suspension .. 610 milliGRAM(s) Oral every 6 hours  amLODIPine   Tablet 2.5 milliGRAM(s) Oral daily  atorvastatin 40 milliGRAM(s) Oral at bedtime  buPROPion XL . 150 milliGRAM(s) Oral daily  famotidine    Tablet 20 milliGRAM(s) Oral daily  fentaNYL   Patch  12 MICROgram(s)/Hr 1 Patch Transdermal every 72 hours    MEDICATIONS  (PRN):  morphine  - Injectable 1 milliGRAM(s) IV Push every 4 hours PRN mild mod severe pain        CAPILLARY BLOOD GLUCOSE        I&O's Summary    03 Nov 2019 07:01  -  04 Nov 2019 07:00  --------------------------------------------------------  IN: 0 mL / OUT: 0 mL / NET: 0 mL        PHYSICAL EXAM:  GENERAL: NAD, frail, lethargic   HEAD:  Atraumatic, Normocephalic  NECK: No JVD  CHEST/LUNG: Clear to auscultation bilaterally; No wheeze  HEART: Regular rate and rhythm; S1S2  ABDOMEN: Soft, Nontender, Nondistended; Bowel sounds present  EXTREMITIES:  2+ Peripheral Pulses, No clubbing, cyanosis, or edema  PSYCH: AAOx0    LABS:                        8.0    15.06 )-----------( 483      ( 03 Nov 2019 11:44 )             26.3     11-03    141  |  105  |  38<H>  ----------------------------<  211<H>  4.3   |  22  |  0.87    Ca    8.5      03 Nov 2019 11:44    TPro  6.3  /  Alb  3.6  /  TBili  0.5  /  DBili  x   /  AST  22  /  ALT  20  /  AlkPhos  97  11-03    PT/INR - ( 03 Nov 2019 11:44 )   PT: 12.9 sec;   INR: 1.13 ratio         PTT - ( 03 Nov 2019 11:44 )  PTT:22.5 sec          RADIOLOGY & ADDITIONAL TESTS:    Imaging Personally Reviewed:    Consultant(s) Notes Reviewed:  sx    Care Discussed with Consultants/Other Providers: sx

## 2019-11-04 NOTE — PROGRESS NOTE ADULT - SUBJECTIVE AND OBJECTIVE BOX
Patient seen and examined at bedside this am. Pain is controlled. Per seda at BS, would like hospice care for her grandmother. No other complaints at this time.       Vital Signs Last 24 Hrs  T(C): 36.4 (04 Nov 2019 05:13), Max: 37.1 (03 Nov 2019 13:30)  T(F): 97.6 (04 Nov 2019 05:13), Max: 98.7 (03 Nov 2019 13:30)  HR: 88 (04 Nov 2019 05:13) (39 - 88)  BP: 139/85 (04 Nov 2019 05:13) (139/85 - 172/78)  BP(mean): --  RR: 18 (04 Nov 2019 05:13) (16 - 18)  SpO2: 99% (04 Nov 2019 05:13) (98% - 100%)    PE:       Gen: NAD  Right Upper Extremity:    splint c/d/i  SILT C5-T1  +AIN/PIN/radial/ulnar/median/musc  +radial pulse  soft compartments, - calf ttp

## 2019-11-04 NOTE — CONSULT NOTE ADULT - ASSESSMENT
89 y/o s/p fall, history as above, sustained right wrist fx and pubic ramus fracture, family wish for pain control and hospice called for pain management and goals of care
89F pmhx dementia, HTN, CAD, depression, brought in after a fall from standing found to have diffuse colitis on CT scan X-rays revealed wrist fracture and pubic rami fracture.
89F w/ dementia and some likely malignancy (unclear etiology, daughter/HCP has decided to not pursue further work-up or tell patient), recent chronic diarrhea w/o work-up on Imodium, p/w fall in the setting of melena and anemia to 8. S/p wrist fx. CT Abd showing colitis and possible colon mass. Pt DNI/DNR, daughter does not want invasive procedures including endoscopy. Wants to focus on comfort.    Impression:  #Colitis and colon mass c/f malignancy and/or infection  #Trauma    Recommendations:  - no plan for endoscopic or other work-up of abd pain etiology or colitis/mass  - f/u palliative care recs  - pain control per primary team  - please call w/ further questions    Saleem Swain  Gastroenterology Fellow  Pager# [phone] or [phone]  Page on-call GI fellow after 5pm or on weekends
89 y/o F w/ PMHx of dementia, HTN, CAD, depression, level 1 trauma for hypotension s/p unwitnessed fall found to have diffuse colitis, GI hemorrhage, possible mass with concern for malignancy and/or infection,  acute fracture of the distal radial metadyiaphysis , and a comminuted fractures of the right superior and inferior pubic tubercles. Patient is s/p transfusion of 2 U pRBC. Palliative care consulted for GOC, symptom management and complex decision making in the setting of dementia and advanced illness.

## 2019-11-04 NOTE — CONSULT NOTE ADULT - PROBLEM SELECTOR RECOMMENDATION 3
In setting of fall, pain meds  Baseline moderate dementia but able to dress self and ambulate without assistance.

## 2019-11-04 NOTE — CHART NOTE - NSCHARTNOTEFT_GEN_A_CORE
Discussed with patient's family their ongoing concerns with her taking PO medication, but also that she's moaning and seems to be uncomfortable. Palliative had decreased morphine dose, Dr. Gaxiola discussed a fentanyl patch with the daughter and granddaughter and they were amenable. Will request 12 mcg fentanyl patch, appreciate Palliative management.

## 2019-11-04 NOTE — DISCHARGE NOTE PROVIDER - NSDCCPCAREPLAN_GEN_ALL_CORE_FT
PRINCIPAL DISCHARGE DIAGNOSIS  Diagnosis: Fall, initial encounter  Assessment and Plan of Treatment:       SECONDARY DISCHARGE DIAGNOSES  Diagnosis: Lower abdominal pain  Assessment and Plan of Treatment:     Diagnosis: Acute pain of right hip  Assessment and Plan of Treatment:     Diagnosis: Gastrointestinal hemorrhage, unspecified gastrointestinal hemorrhage type  Assessment and Plan of Treatment:     Diagnosis: Wrist injury, right, initial encounter  Assessment and Plan of Treatment: PRINCIPAL DISCHARGE DIAGNOSIS  Diagnosis: Fall, initial encounter  Assessment and Plan of Treatment: Follow up with Palliative and Hospice Care.  May call. Dr. Gaxiola, Acute Care Surgery [phone] for questions regarding this hospitalization.        SECONDARY DISCHARGE DIAGNOSES  Diagnosis: Lower abdominal pain  Assessment and Plan of Treatment:     Diagnosis: Acute pain of right hip  Assessment and Plan of Treatment:     Diagnosis: Gastrointestinal hemorrhage, unspecified gastrointestinal hemorrhage type  Assessment and Plan of Treatment:     Diagnosis: Wrist injury, right, initial encounter  Assessment and Plan of Treatment: PRINCIPAL DISCHARGE DIAGNOSIS  Diagnosis: Fall, initial encounter  Assessment and Plan of Treatment: Follow up with Palliative and Hospice Care.  May call. Dr. Travis, Acute Care Surgery [phone] for questions regarding this hospitalization.        SECONDARY DISCHARGE DIAGNOSES  Diagnosis: Lower abdominal pain  Assessment and Plan of Treatment:     Diagnosis: Acute pain of right hip  Assessment and Plan of Treatment:     Diagnosis: Gastrointestinal hemorrhage, unspecified gastrointestinal hemorrhage type  Assessment and Plan of Treatment:     Diagnosis: Wrist injury, right, initial encounter  Assessment and Plan of Treatment:

## 2019-11-04 NOTE — PROGRESS NOTE ADULT - SUBJECTIVE AND OBJECTIVE BOX
Subjective:  Patient see and evaluated this AM with team.  Patient asleep so exam limited.  Granddaughter at bedside.  She reiterated family only wants to pursue comfort care measures.  Her only concern was patient's pain controlled.    OBJECTIVE:     ** PHYSICAL EXAM **  Limited exam to not disturb patient.       ** VITAL SIGNS / I&O's **    Vital Signs Last 24 Hrs  T(C): 36.4 (04 Nov 2019 05:13), Max: 37.1 (03 Nov 2019 13:30)  T(F): 97.6 (04 Nov 2019 05:13), Max: 98.7 (03 Nov 2019 13:30)  HR: 88 (04 Nov 2019 05:13) (39 - 88)  BP: 139/85 (04 Nov 2019 05:13) (139/85 - 172/78)  BP(mean): --  RR: 18 (04 Nov 2019 05:13) (16 - 18)  SpO2: 99% (04 Nov 2019 05:13) (98% - 100%)      03 Nov 2019 07:01  -  04 Nov 2019 07:00  --------------------------------------------------------  IN:  Total IN: 0 mL    OUT:  Total OUT: 0 mL    Total NET: 0 mL            ** LABS **                          8.0    15.06 )-----------( 483      ( 03 Nov 2019 11:44 )             26.3     03 Nov 2019 11:44    141    |  105    |  38     ----------------------------<  211    4.3     |  22     |  0.87     Ca    8.5        03 Nov 2019 11:44    TPro  6.3    /  Alb  3.6    /  TBili  0.5    /  DBili  x      /  AST  22     /  ALT  20     /  AlkPhos  97     03 Nov 2019 11:44    PT/INR - ( 03 Nov 2019 11:44 )   PT: 12.9 sec;   INR: 1.13 ratio         PTT - ( 03 Nov 2019 11:44 )  PTT:22.5 sec  CAPILLARY BLOOD GLUCOSE            LIVER FUNCTIONS - ( 03 Nov 2019 11:44 )  Alb: 3.6 g/dL / Pro: 6.3 g/dL / ALK PHOS: 97 U/L / ALT: 20 U/L / AST: 22 U/L / GGT: x                 MEDICATIONS  (STANDING):  acetaminophen    Suspension .. 610 milliGRAM(s) Oral every 6 hours  amLODIPine   Tablet 2.5 milliGRAM(s) Oral daily  atorvastatin 40 milliGRAM(s) Oral at bedtime  buPROPion XL . 150 milliGRAM(s) Oral daily  famotidine    Tablet 20 milliGRAM(s) Oral daily    MEDICATIONS  (PRN):  morphine  - Injectable 2 milliGRAM(s) IV Push every 4 hours PRN Severe Pain (7 - 10)

## 2019-11-04 NOTE — DISCHARGE NOTE NURSING/CASE MANAGEMENT/SOCIAL WORK - PATIENT PORTAL LINK FT
You can access the FollowMyHealth Patient Portal offered by [facility] by registering at the following website: [email or web address]. By joining [employer]’s FollowMyHealth portal, you will also be able to view your health information using other applications (apps) compatible with our system.

## 2019-11-04 NOTE — PROGRESS NOTE ADULT - ASSESSMENT
A/P: 89y F w/ R  Fracture     Analgesia  NWB RUE in Spl[place]   sling for comfort   [facility]  FU SW/CM  DVT ppx  WBAT  PT/OT  will d/w attending and advise if plan changes.

## 2019-11-04 NOTE — CONSULT NOTE ADULT - PROBLEM SELECTOR RECOMMENDATION 5
Spent 30 minutes with HCP , completed MOLST once again with appropriate signitures and witness.  Capacity form also completed.  Living Will and HCP documents in chart.  Family wish full comfort approach, wish to speak with hopsice. Referral established.  Patient unable to take PO at this time, re evaluate wakefullness on lower morphine dose .   De escalate non essential medications.  May be inpatient appropriate.

## 2019-11-04 NOTE — CONSULT NOTE ADULT - SUBJECTIVE AND OBJECTIVE BOX
HPI:  89F pmhx dementia, HTN, CAD, depression, brought in after a fall from standing, upgraded to level 1 trauma activation for hypotension. Per the patient and her daughter, the patient was found down on the floor after a fall today. She denies head trauma and is unclear if she lost consciousness. She does not clearly recall the events surrounding her fall, but does believe she was having a bowel movement immediately prior. Her main complaints are right-sided abdominal/hip pain and right wrist pain.     On arrival to the ED, primary survey intact. GCS 15. Secondary notable for tenderness of the right chest wall, right wrist, lower abdomen, and right hip. (03 Nov 2019 14:35)    PERTINENT PM/SXH:   Cataract  Thoracic Back Pain  Upper Back Pain  Rib Fracture left side  - from fall one months ago  History of Osteoporosis  HTN (Hypertension)  Hypertriglyceridemia  GERD (Gastroesophageal Reflux Disease)  AI (Aortic Incompetence)    Cataract, repair  - right  - 7/10  History of Colonoscopy  History of Tonsillectomy  History of Aortic Valve Replacement 2001    FAMILY HISTORY:  No pertinent family history in first degree relatives    ITEMS NOT CHECKED ARE NOT PRESENT    SOCIAL HISTORY:   Significant other/partner:  [x ]  Children:  [x ]  Lutheran/Spirituality:Restoration   Substance hx:  [ ]   Tobacco hx:  [ ]   Alcohol hx: [ ]   Home Opioid hx:  [ ] I-Stop Reference No:  Living Situation: [ ]Home  [ ]Long term care  [ ]Rehab [X ]Other  [facility]     ADVANCE DIRECTIVES:    DNR  Yes  MOLST  [ X]  Living Will  [X ]   DECISION MAKER(s):  [ X] Health Care Proxy(s)  [ ] Surrogate(s)  [ ] Guardian           Name(s): Phone Number(s):  JAMIE PEÑA   BASELINE (I)ADL(s) (prior to admission):  [place]: [ ]Total  [X ] Moderate [ ]Dependent    Allergies    No Known Allergies    Intolerances    MEDICATIONS  (STANDING):  acetaminophen    Suspension .. 610 milliGRAM(s) Oral every 6 hours  amLODIPine   Tablet 2.5 milliGRAM(s) Oral daily  atorvastatin 40 milliGRAM(s) Oral at bedtime  buPROPion XL . 150 milliGRAM(s) Oral daily  famotidine    Tablet 20 milliGRAM(s) Oral daily    MEDICATIONS  (PRN):  morphine  - Injectable 2 milliGRAM(s) IV Push every 4 hours PRN Severe Pain (7 - 10)    PRESENT SYMPTOMS: [X ]Unable to obtain due to poor mentation   Source if other than patient:  [ ]Family   [ ]Team     Pain: [ ] yes [ ] no  QOL impact -   Location -                    Aggravating factors -  Quality -  Radiation -  Timing-  Severity (0-10 scale):  Minimal acceptable level (0-10 scale):     PAIN AD Score: 0    [email or web address] (press ctrl +  left click to view)    Dyspnea:                           [ ]Mild [ ]Moderate [ ]Severe  Anxiety:                             [ ]Mild [ ]Moderate [ ]Severe  Fatigue:                             [ ]Mild [ ]Moderate [ ]Severe  Nausea:                             [ ]Mild [ ]Moderate [ ]Severe  Loss of appetite:              [ ]Mild [ ]Moderate [ ]Severe  Constipation:                    [ ]Mild [ ]Moderate [ ]Severe    Other Symptoms:  [X ]All other review of systems negative     Karnofsky Performance Score/Palliative Performance Status Version 2:     10    %    [email or web address]/files/[email or web address]_performance_scale_[email or web address].pdf  PHYSICAL EXAM:  Vital Signs Last 24 Hrs  T(C): 36.4 (04 Nov 2019 05:13), Max: 37.1 (03 Nov 2019 13:30)  T(F): 97.6 (04 Nov 2019 05:13), Max: 98.7 (03 Nov 2019 13:30)  HR: 88 (04 Nov 2019 05:13) (39 - 88)  BP: 139/85 (04 Nov 2019 05:13) (139/85 - 172/78)  BP(mean): --  RR: 18 (04 Nov 2019 05:13) (16 - 18)  SpO2: 99% (04 Nov 2019 05:13) (98% - 100%) I&O's Summary    03 Nov 2019 07:01  -  04 Nov 2019 07:00  --------------------------------------------------------  IN: 0 mL / OUT: 0 mL / NET: 0 mL    GENERAL:  [ ]Alert  [ ]Oriented x   [ X]Lethargic  [X ]Cachexia  [ ]Unarousable  [ ]Verbal  [X ]Non-Verbal  Behavioral:   [ ] Anxiety  [ ] Delirium [ ] Agitation [ ] Other  HEENT:  [ ]Normal   [X ]Dry mouth   [ ]ET Tube/Trach  [ ]Oral lesions  PULMONARY:   [ X]Clear [ ]Tachypnea  [ ]Audible excessive secretions   [ ]Rhonchi        [ ]Right [ ]Left [ ]Bilateral  [ ]Crackles        [ ]Right [ ]Left [ ]Bilateral  [ ]Wheezing     [ ]Right [ ]Left [ ]Bilateral  CARDIOVASCULAR:    [ X]Regular [ ]Irregular [ ]Tachy  [ ]Brannon [ ]Murmur [ ]Other  GASTROINTESTINAL:  [X ]SoftLY DISTENDED  [ ]Distended   [X ]+BS  [ X]Non tender [ ]Tender  [ ]PEG [ ]OGT/ NGT  Last BM:   GENITOURINARY:  [ ]Normal [ X] Incontinent   [ ]Oliguria/Anuria   [ ]Vidal  MUSCULOSKELETAL:   [ ]Normal   [ ]Weakness  [ X]Bed/Wheelchair bound [ ]Edema  NEUROLOGIC:   [ ]No focal deficits  [X ] Cognitive impairment  [ ] Dysphagia [ ]Dysarthria [ ] Paresis [ ]Other   SKIN:   X[ ]Normal   [ ]Pressure ulcer(s)  [ ]Rash    CRITICAL CARE:  [ ] Shock Present  [ ]Septic [ ]Cardiogenic [ ]Neurologic [ ]Hypovolemic  [ ]  Vasopressors [ ]  Inotropes   [ ] Respiratory failure present [ ] mechanical ventilation [ ] non-invasive ventilatory support [ ] High flow  [ ] Acute  [ ] Chronic [ ] Hypoxic  [ ] Hypercarbic [ ] Other  [ ] Other organ failure     LABS:                        8.0    15.06 )-----------( 483      ( 03 Nov 2019 11:44 )             26.3   11-03    141  |  105  |  38<H>  ----------------------------<  211<H>  4.3   |  22  |  0.87    Ca    8.5      03 Nov 2019 11:44    TPro  6.3  /  Alb  3.6  /  TBili  0.5  /  DBili  x   /  AST  22  /  ALT  20  /  AlkPhos  97  11-03  PT/INR - ( 03 Nov 2019 11:44 )   PT: 12.9 sec;   INR: 1.13 ratio         PTT - ( 03 Nov 2019 11:44 )  PTT:22.5 sec      RADIOLOGY & ADDITIONAL STUDIES:    < from: CT Abdomen and Pelvis w/ IV Cont (11.03.19 @ 12:19) >      INTERPRETATION:  CLINICAL INFORMATION: Patient presents status post   syncope and fall complaining of bloody stools.    COMPARISON: None.    PROCEDURE:   CT of the Chest, Abdomen and Pelvis was performed with intravenous   contrast.   Imaging was performed through the chest in the arterial phase followed by   imaging of the abdomen and pelvis in the portal venous phase.  Intravenous contrast: 90 ml Omnipaque 350. 10 ml discarded.  Oral contrast: None.  Sagittal and coronal reformats were performed.    FINDINGS:    CHEST:     LUNGS AND LARGE AIRWAYS: Patent central airways. Right lower lobe   pulmonary nodule measuring 1.5 cm (2, 59) is unchanged since 2017. A   groundglass nodule in the superior segment of the left lower lobe   measures 5 mm (2, 39) and is new when compared to prior study. Right   lower lobe calcified granuloma. Bibasilar subsegmental atelectasis.  PLEURA: No pleural effusion.  VESSELS: Atherosclerotic changes of the aorta.  HEART: Heart size is normal. Status post aortic valve replacement. No   pericardial effusion.  MEDIASTINUM AND ESTRELLA: No lymphadenopathy.  CHEST WALL AND LOWER NECK: Status post median sternotomy. Subcutaneous   loop recorder present.    ABDOMEN AND PELVIS:    LIVER: A right hepatic lobe hemangioma measuring 3.8 cm, unchanged since   2016. Additional subcentimeter foci too small tocharacterize.  BILE DUCTS: Normal caliber.  GALLBLADDER: Within normal limits.  SPLEEN: Within normal limits.  < from: CT Abdomen and Pelvis w/ IV Cont (11.03.19 @ 12:19) >  ANCREAS: Within normal limits.  ADRENALS: Within normal limits.  KIDNEYS/URETERS: Right renal cysts, largest interpolar cyst measuring up   to 2.4 cm. No hydronephrosis.    BLADDER: Within normal limits.  REPRODUCTIVE ORGANS: Fibroid uterus.    BOWEL: No high-grade bowel obstruction. Diffuse wall thickening and   pericolonic fat stranding of the descending colon and sigmoid likely   representing colitis. Sigmoid diverticulosis. Additionally short segment   focal wall thickening is visualized in the mid transverse colon (3, 60   and 606, 16) concerning for an underlying mass.  PERITONEUM: Trace ascites. No drainable fluid collection or   pneumoperitoneum.  VESSELS: Atherosclerotic changes.  RETROPERITONEUM/LYMPH NODES: No lymphadenopathy.    ABDOMINAL WALL: Within normal limits.  BONES: Chronic superior endplate compression deformity of T8 and T6   vertebral bodies, as seen in prior CT chest 6/7/2017. Age indeterminate   left lateral ninth and 10th rib fractures. No acute fracture. Grade 1   anterolisthesis of L4 on L5. Degenerative changes.    IMPRESSION:     Diffuse colitis involving the descending and sigmoid colon with   pericolonic fat stranding.    Short segment focal wall thickening of the mid transverse colon   concerning for underlying mass. Colonoscopy is recommended for further   evaluation.    Trace ascites.    Age indeterminate left lateral ninth and 10th ribfractures.      < from: Xray Wrist 3 Views, Right (11.03.19 @ 15:04) >      INTERPRETATION:      CLINICAL INDICATION: Right wrist pain    TECHNIQUE: 3 views of the right wrist     COMPARISON: None available    FINDINGS/  IMPRESSION:    Acute, comminuted fracture of the distal radius with volar angulation.   Overlying soft tissue swelling is present.  First MCP joint space is narrowed, consistent with osteoarthritis.    < from: Xray Hip 2-3 Views, Right (11.03.19 @ 15:04) >    INTERPRETATION:      CLINICAL INDICATION: Trauma code. Found down    TECHNIQUE: AP view of the pelvis, 2 views ofthe right hip     COMPARISON: CT abdomen pelvis 11/3/2019    FINDINGS:    Contrast material in the bladder obscures complete evaluation of the   pelvis. Fractures of the right inferior and superior pubic rami.   Concerning for fracture.  No fracture of the right or left femur is identified.  Joint spaces are maintained.    IMPRESSION:  Fracture, right superior pubic ramus with buckle fracture of the right   inferior pubic ramus.      The above findings correspond to a note entered into the patient's   medical record by the emergency department staff at the time of the   study.         < end of copied text >    PROTEIN CALORIE MALNUTRITION PRESENT: [ ] Yes [ ] No  [ ] PPSV2 < or = to 30% [ ] significant weight loss  [X ] poor nutritional intake [ ] catabolic state [ ] anasarca     Albumin, Serum: 3.6 g/dL (11-03-19 @ 11:44)  Artificial Nutrition [ ]     REFERRALS:   [ ]Chaplaincy  [X ] Hospice  [ ]Child Life  [ ]Social Work  [ X]Case management [ ]Holistic Therapy     Goals of Care Document:

## 2019-11-04 NOTE — CONSULT NOTE ADULT - PROBLEM SELECTOR RECOMMENDATION 2
Sustained right wrist fracture and pubic ramus fractures.   Appreciate Ortho note, family decline surgery

## 2019-11-04 NOTE — PROVIDER CONTACT NOTE (OTHER) - BACKGROUND
pt s/p fall at home. pt has multiple fractures on right side. pt is aox1./ pt is comfort measures only.

## 2019-11-06 ENCOUNTER — OTHER (OUTPATIENT)
Age: 84
End: 2019-11-06

## 2020-08-19 NOTE — ED PROVIDER NOTE - FAMILY HISTORY
I spoke with the patient and offered her an earlier appointment time with another sports med doctor Dr. Reddy on Thursday Sep. 3rd. The patient stated that she preferred to keep her current scheduled appointment. She had no further questions at this time.     NYLA Kwon  
No pertinent family history in first degree relatives

## 2020-10-23 NOTE — ED ADULT NURSE NOTE - FALL HARM RISK CONCLUSION
Prior to immunization administration, verified patients identity using patient s name and date of birth. Please see Immunization Activity for additional information.     Screening Questionnaire for Pediatric Immunization    Is the child sick today?   No   Does the child have allergies to medications, food, a vaccine component, or latex?   No   Has the child had a serious reaction to a vaccine in the past?   No   Does the child have a long-term health problem with lung, heart, kidney or metabolic disease (e.g., diabetes), asthma, a blood disorder, no spleen, complement component deficiency, a cochlear implant, or a spinal fluid leak?  Is he/she on long-term aspirin therapy?   No   If the child to be vaccinated is 2 through 4 years of age, has a healthcare provider told you that the child had wheezing or asthma in the  past 12 months?   No   If your child is a baby, have you ever been told he or she has had intussusception?   No   Has the child, sibling or parent had a seizure, has the child had brain or other nervous system problems?   No   Does the child have cancer, leukemia, AIDS, or any immune system         problem?   No   Does the child have a parent, brother, or sister with an immune system problem?   No   In the past 3 months, has the child taken medications that affect the immune system such as prednisone, other steroids, or anticancer drugs; drugs for the treatment of rheumatoid arthritis, Crohn s disease, or psoriasis; or had radiation treatments?   No   In the past year, has the child received a transfusion of blood or blood products, or been given immune (gamma) globulin or an antiviral drug?   No   Is the child/teen pregnant or is there a chance that she could become       pregnant during the next month?   No   Has the child received any vaccinations in the past 4 weeks?   No      Immunization questionnaire answers were all negative.        [place] given to patient.    Per  orders of Dr. Hills, injection of Fluzone given by Naomie Tolentino CMA. Patient instructed to remain in clinic for 15 minutes afterwards, and to report any adverse reaction to me immediately.    Screening performed by Naomie Tolentino CMA on 10/23/2020 at 2:49 PM.     Fall with Harm Risk

## 2021-10-06 PROBLEM — I10 ESSENTIAL HYPERTENSION: Status: ACTIVE | Noted: 2018-02-21

## 2024-01-17 NOTE — PROGRESS NOTE ADULT - ASSESSMENT
Prior to immunization administration, verified patients identity using patient s name and date of birth. Please see Immunization Activity for additional information.     Screening Questionnaire for Adult Immunization    Are you sick today?   No   Do you have allergies to medications, food, a vaccine component or latex?   No   Have you ever had a serious reaction after receiving a vaccination?   No   Do you have a long-term health problem with heart, lung, kidney, or metabolic disease (e.g., diabetes), asthma, a blood disorder, no spleen, complement component deficiency, a cochlear implant, or a spinal fluid leak?  Are you on long-term aspirin therapy?   No   Do you have cancer, leukemia, HIV/AIDS, or any other immune system problem?   No   Do you have a parent, brother, or sister with an immune system problem?   No   In the past 3 months, have you taken medications that affect  your immune system, such as prednisone, other steroids, or anticancer drugs; drugs for the treatment of rheumatoid arthritis, Crohn s disease, or psoriasis; or have you had radiation treatments?   No   Have you had a seizure, or a brain or other nervous system problem?   No   During the past year, have you received a transfusion of blood or blood    products, or been given immune (gamma) globulin or antiviral drug?   No   For women: Are you pregnant or is there a chance you could become       pregnant during the next month?   No   Have you received any vaccinations in the past 4 weeks?   No     Immunization questionnaire answers were all negative.    I have reviewed the following standing orders:   This patient is due and qualifies for the Covid-19 vaccine.     Click here for COVID-19 Standing Order    I have reviewed the vaccines inclusion and exclusion criteria; No concerns regarding eligibility.     Patient instructed to remain in clinic for 15 minutes afterwards, and to report any adverse reactions.     Screening performed by Safia Arriaga  MA on 1/17/2024 at 3:29 PM.     89F pmhx dementia, HTN, CAD, depression, level 1 trauma for hypotension following a fall from standing, found to have diffuse colitis with some GI bleeding, as well as hip fractures.      Plan:  - Continue comfort care  - No operative interventions  - Will consult hospice/ palliative care

## 2024-09-21 NOTE — REVIEW OF SYSTEMS
normal latch [Fatigue] : fatigue [Skin Rash] : skin rash [Depression] : depression [Negative] : Heme/Lymph [de-identified] : hort-term memory is markedly diminished